# Patient Record
Sex: FEMALE | Race: BLACK OR AFRICAN AMERICAN | Employment: UNEMPLOYED | URBAN - METROPOLITAN AREA
[De-identification: names, ages, dates, MRNs, and addresses within clinical notes are randomized per-mention and may not be internally consistent; named-entity substitution may affect disease eponyms.]

---

## 2021-10-22 ENCOUNTER — HOSPITAL ENCOUNTER (EMERGENCY)
Age: 21
Discharge: HOME | End: 2021-10-22
Attending: PEDIATRICS
Payer: MEDICAID

## 2021-10-22 ENCOUNTER — APPOINTMENT (OUTPATIENT)
Dept: CT IMAGING | Age: 21
End: 2021-10-22
Attending: PEDIATRICS
Payer: MEDICAID

## 2021-10-22 ENCOUNTER — APPOINTMENT (OUTPATIENT)
Dept: GENERAL RADIOLOGY | Age: 21
End: 2021-10-22
Attending: PEDIATRICS
Payer: MEDICAID

## 2021-10-22 VITALS
DIASTOLIC BLOOD PRESSURE: 77 MMHG | WEIGHT: 207.01 LBS | OXYGEN SATURATION: 99 % | SYSTOLIC BLOOD PRESSURE: 117 MMHG | HEART RATE: 69 BPM | RESPIRATION RATE: 16 BRPM | TEMPERATURE: 98.4 F

## 2021-10-22 DIAGNOSIS — J01.40 ACUTE PANSINUSITIS, RECURRENCE NOT SPECIFIED: Primary | ICD-10-CM

## 2021-10-22 LAB
FLUAV AG NPH QL IA: NEGATIVE
FLUBV AG NOSE QL IA: NEGATIVE
SARS-COV-2, COV2: NORMAL

## 2021-10-22 PROCEDURE — 87804 INFLUENZA ASSAY W/OPTIC: CPT

## 2021-10-22 PROCEDURE — 70486 CT MAXILLOFACIAL W/O DYE: CPT

## 2021-10-22 PROCEDURE — 99283 EMERGENCY DEPT VISIT LOW MDM: CPT

## 2021-10-22 PROCEDURE — 71045 X-RAY EXAM CHEST 1 VIEW: CPT

## 2021-10-22 PROCEDURE — U0003 INFECTIOUS AGENT DETECTION BY NUCLEIC ACID (DNA OR RNA); SEVERE ACUTE RESPIRATORY SYNDROME CORONAVIRUS 2 (SARS-COV-2) (CORONAVIRUS DISEASE [COVID-19]), AMPLIFIED PROBE TECHNIQUE, MAKING USE OF HIGH THROUGHPUT TECHNOLOGIES AS DESCRIBED BY CMS-2020-01-R: HCPCS

## 2021-10-22 RX ORDER — PREDNISONE 20 MG/1
40 TABLET ORAL
Qty: 6 TABLET | Refills: 0 | Status: SHIPPED | OUTPATIENT
Start: 2021-10-22 | End: 2021-10-25

## 2021-10-22 RX ORDER — AMOXICILLIN AND CLAVULANATE POTASSIUM 875; 125 MG/1; MG/1
1 TABLET, FILM COATED ORAL 2 TIMES DAILY
Qty: 20 TABLET | Refills: 0 | Status: SHIPPED | OUTPATIENT
Start: 2021-10-22 | End: 2021-11-01

## 2021-10-22 NOTE — ED TRIAGE NOTES
Pt reports she started with sore throat Friday which has since resolved. Congestion and SOB started Monday. Denies fever. Denies vomiting or diarrhea.

## 2021-10-22 NOTE — ED NOTES
DISCHARGE: Patient given discharge instructions including prescriptions, suggested FU with PCP, accessing My Chart, returning for s/s of worsening, voiced understanding. EDUCATION: Patient educated on prescriptions and possible side effects, using motrin/tylenol for fever/pain, increasing PO fluids, provided work/school excuse and COVID note, educated on accessing My Chart for COVID results, monitoring for s/s of worsening such as increased work of breathing/lethargy/inability to tolerated PO fluids, practicing good hand/cough hygiene, voiced understanding.

## 2021-10-22 NOTE — ED PROVIDER NOTES
HPI wise healthy 71-year-old woman presents to the ER with nasal congestion and rhinorrhea and headaches when she moves her head for the last several days. She feels a little short of breath and has rare cough. She has had no fever and no vomiting but is lost her sense of taste and loss of sense of smell. She is currently on her menstrual period and states she is not pregnant. History reviewed. No pertinent past medical history. History reviewed. No pertinent surgical history. History reviewed. No pertinent family history. Social History     Socioeconomic History    Marital status: Not on file     Spouse name: Not on file    Number of children: Not on file    Years of education: Not on file    Highest education level: Not on file   Occupational History    Not on file   Tobacco Use    Smoking status: Never Smoker    Smokeless tobacco: Never Used   Substance and Sexual Activity    Alcohol use: Not Currently    Drug use: Never    Sexual activity: Not on file   Other Topics Concern    Not on file   Social History Narrative    Not on file     Social Determinants of Health     Financial Resource Strain:     Difficulty of Paying Living Expenses:    Food Insecurity:     Worried About Running Out of Food in the Last Year:     [address] in the Last Year:    Transportation Needs:     Lack of Transportation (Medical):      Lack of Transportation (Non-Medical):    Physical Activity:     Days of Exercise per Week:     Minutes of Exercise per Session:    Stress:     Feeling of Stress :    Social Connections:     Frequency of Communication with Friends and Family:     Frequency of Social Gatherings with Friends and Family:     Attends Protestant Services:     Active Member of Clubs or Organizations:     Attends Club or Organization Meetings:     Marital Status:    Intimate Partner Violence:     Fear of Current or Ex-Partner:     Emotionally Abused:     Physically Abused:     Sexually Abused:    Medications: None  Immunizations: Up-to-date including Covid vaccine and influenza vaccine  Social history: Patient does not smoke, drink, or use drugs. Patient is not sexually active and denies trauma. ALLERGIES: Patient has no known allergies. Review of Systems   Constitutional: Negative for fever. States she has lost her sense of taste and smell   HENT: Positive for congestion and rhinorrhea. Respiratory: Positive for cough. Gastrointestinal: Negative for diarrhea and vomiting. All other systems reviewed and are negative. Vitals:    10/22/21 1143 10/22/21 1144   BP: 129/87    Pulse: 89    Resp: 16    Temp: 97 °F (36.1 °C)    SpO2: 97%    Weight:  93.9 kg (207 lb 0.2 oz)            Physical Exam   Nursing note and vitals reviewed. Constitutional: appears well-developed and well-nourished. No distress. HENT:   Head: Normocephalic and atraumatic. Sclera anicteric  Nose: Swollen boggy red turbinates bilaterally. Mouth/Throat: Oropharynx is clear and moist. Pharynx normal  Eyes: Conjunctivae are normal. Pupils are equal, round, and reactive to light. Right eye exhibits no discharge. Left eye exhibits no discharge. No scleral icterus. Neck: Painless normal range of motion. Supple  Cardiovascular: Normal rate, regular rhythm, normal heart sounds and intact distal pulses. Exam reveals no gallop and no friction rub. No murmur heard. Pulmonary/Chest: Effort normal and breath sounds normal. No respiratory distress. no wheezes. no rales. Abdominal: Soft. Bowel sounds are normal. Exhibits no distension and no mass. No tenderness. No guarding. Musculoskeletal: Normal range of motion. no tenderness. No edema  Lymphadenopathy:   No cervical adenopathy. Neurological:  Alert and oriented to person, place, and time. Coordination normal. CN 2-12 intact. Moving all extremities. Skin: Skin is warm and dry. No rash noted. No pallor.      MDM  Number of Diagnoses or Management Options  Diagnosis management comments: Well-appearing 81-year-old woman who is concern for the possibility of a sinus infection versus COVID-19. She is being seen with her sister who has similar complaints and had 2 negative Covid test prior to her arrival.  Will obtain a sinus screening CT as she clinically appears to have sinusitis, obtain a portable chest x-ray, obtain a COVID-19 PCR and a rapid flu test.    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 XR CHEST PORT   Final Result   No Acute Disease. Labs Reviewed   INFLUENZA A+B VIRAL AGS   SARS-COV-2   SARS-COV-2   Influenza test negative, COVID-19 test pending. Sinusitis by CT in a patient with signs and symptoms and examination consistent with sinusitis. Treat with 10 days of Augmentin and a 3-day prednisone burst.  Follow-up with primary care physician Monday.   To isolate at home pending the results of her COVID-19 test.       Procedures

## 2021-10-22 NOTE — DISCHARGE INSTRUCTIONS
You were seen in the emergency department with upper respiratory infection symptoms and sinusitis symptoms and were diagnosed with pansinusitis by CT. Your influenza test is negative and your COVID-19 test is pending. Please isolate at home to the results of your COVID-19 test are known and you have been cleared by your primary care provider. Please take the Augmentin and the prednisone as prescribed and follow-up Monday with your primary care provider. Return to the emergency department for increased work of breathing or any concerns. Thank you for allowing us to provide you with medical care today. We realize that you have many choices for your emergency care needs. We thank you for choosing [facility].  Please choose us in the future for any continued health care needs. We hope we addressed all of your medical concerns. We strive to provide excellent quality care in the Emergency Department. Anything less than excellent does not meet our expectations. The exam and treatment you received in the Emergency Department were for an emergent problem and are not intended as complete care. It is important that you follow up with a doctor, nurse practitioner, or [phone] assistant for ongoing care. If your symptoms worsen or you do not improve as expected and you are unable to reach your usual health care provider, you should return to the Emergency Department. We are available 24 hours a day. Take this sheet with you when you go to your follow-up visit. If you have any problem arranging the follow-up visit, contact the Emergency Department immediately. Make an appointment your family doctor for follow up of this visit. Return to the ER if you are unable to be seen in a timely manner.

## 2021-10-22 NOTE — Clinical Note
[address]  [address] [facility] DEPT  [address] [facility] [address]  [phone]    Work/School Note    Date: 10/22/2021     To Whom It May concern:    Edward Shannon was evaulated by the following provider(s):  Attending Provider: Neftali Nicolas MD.   Becka Arzate virus is suspected. Per the CDC guidelines we recommend home isolation until the following conditions are all met:    1. At least 10 days have passed since symptoms first appeared and  2. At least 24 hours have passed since last fever without the use of fever-reducing medications and  3.  Symptoms (e.g., cough, shortness of breath) have improved    Sincerely,          Marcial Hutchinson MD

## 2021-10-22 NOTE — Clinical Note
[address]  [address] [facility] DEPT  [address] [facility] [address]  [phone]    Work/School Note    Date: 10/22/2021    To Whom It May concern:    Adelina Hernandez was seen and treated today in the emergency room by the following provider(s):  Attending Provider: Dave Bergeron MD.      Adelina Hernandez is excused from work/school on 10/22/2021 through 10/25/2021. She is medically clear to return to work/school on 10/26/2021.         Sincerely,          Rosalina Mccartney MD

## 2021-10-24 LAB
SARS-COV-2, XPLCVT: NOT DETECTED
SOURCE, COVRS: NORMAL

## 2022-05-06 ENCOUNTER — OFFICE VISIT (OUTPATIENT)
Dept: PRIMARY CARE CLINIC | Age: 22
End: 2022-05-06
Payer: MEDICAID

## 2022-05-06 VITALS
TEMPERATURE: 97.2 F | DIASTOLIC BLOOD PRESSURE: 76 MMHG | RESPIRATION RATE: 12 BRPM | WEIGHT: 206 LBS | HEIGHT: 69 IN | HEART RATE: 60 BPM | OXYGEN SATURATION: 97 % | BODY MASS INDEX: 30.51 KG/M2 | SYSTOLIC BLOOD PRESSURE: 118 MMHG

## 2022-05-06 DIAGNOSIS — L74.510 HYPERHIDROSIS OF AXILLA: ICD-10-CM

## 2022-05-06 DIAGNOSIS — Z76.89 ENCOUNTER TO ESTABLISH CARE: ICD-10-CM

## 2022-05-06 DIAGNOSIS — Z00.00 ANNUAL PHYSICAL EXAM: Primary | ICD-10-CM

## 2022-05-06 DIAGNOSIS — E55.9 VITAMIN D DEFICIENCY: ICD-10-CM

## 2022-05-06 DIAGNOSIS — Z23 NEED FOR HPV VACCINE: ICD-10-CM

## 2022-05-06 DIAGNOSIS — Z11.59 NEED FOR HEPATITIS C SCREENING TEST: ICD-10-CM

## 2022-05-06 DIAGNOSIS — F41.9 ANXIETY: ICD-10-CM

## 2022-05-06 PROCEDURE — 99204 OFFICE O/P NEW MOD 45 MIN: CPT

## 2022-05-06 RX ORDER — HUMAN PAPILLOMAVIRUS 9-VALENT VACCINE, RECOMBINANT 30; 40; 60; 40; 20; 20; 20; 20; 20 UG/.5ML; UG/.5ML; UG/.5ML; UG/.5ML; UG/.5ML; UG/.5ML; UG/.5ML; UG/.5ML; UG/.5ML
0.5 INJECTION, SUSPENSION INTRAMUSCULAR ONCE
Qty: 0.5 ML | Refills: 0 | Status: SHIPPED | OUTPATIENT
Start: 2022-05-06 | End: 2022-05-06

## 2022-05-06 NOTE — PROGRESS NOTES
Chief Complaint   Patient presents with   [address] Maintenance Due   Topic    Hepatitis C Screening     HPV Age 9Y-34Y (2 - 3-dose series)    DTaP/Tdap/Td series (3 - Td or Tdap)        1. Have you been to the ER, urgent care clinic since your last visit? Hospitalized since your last visit? No    2. Have you seen or consulted any other health care providers outside of the [address] [place] [address] since your last visit? Include any pap smears or colon screening.  No    Visit Vitals  Wt 206 lb (93.4 kg)

## 2022-05-06 NOTE — PROGRESS NOTES
[facility]   [address], [address] [facility]  P: [phone]  F: [phone]    SUBJECTIVE     HPI:     Ziyad Boss is a 25 y.o. female who is seen in the clinic to establish care  Chief Complaint   Patient presents with   Frank Hester Establish Care        Patient is new to our practice, here for an annual physical/to establish care. She does not take any daily medications. She is not fasting for labs and will return next week to get blood work done. She c/o excessive sweating to bilateral axilla. She has tried different brands/strengths of deodorants but none have worked. Happens regardless of cold/warm weather. No night sweats, foul odor, or discolored sweat. Excessive sweating in social settings is bothersome to her. C/o anxiety on and off since middle school. 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 Sometimes it is related to excess sweating in social situations or week prior to her period. Denies palpitations. Denies excessive caffeine intake, drinks one coffee/week at most. Drinks at least 8-10 glasses of water per day. She is interested in counseling, does not want to try medication for this at this time. Family history: Mother and father have DM2, HTN. Per patient no hyperlipidemia, cancers, MI/CVA. Diet: eats varied diet of red meat, chicken, vegetables, fruits. Trying to be more mindful of portions. Exercise: Weight lifting, elliptical. 3-4 x per week. Sleep Hygiene: Sleeps at least 6 hour per night. No issues staying or falling asleep. GYN Provider: Dr Rod Milligan with [facility]  Self-Breast Exams: encouraged to do monthly  Menses: Regular, lasting 3-4 days. Denies heavy periods/large clots. Pertinent health screenings:  Never had Pap smear, not sexually active  Immunizations: Had one dose of HPV vaccine, needs to complete series.  COVID-19, Influenza, TDAP, UTD    There are no problems to display for this patient. History reviewed. No pertinent past medical history. History reviewed. No pertinent surgical history. Social History     Socioeconomic History    Marital status: SINGLE     Spouse name: Not on file    Number of children: Not on file    Years of education: Not on file    Highest education level: Not on file   Occupational History    Not on file   Tobacco Use    Smoking status: Never Smoker    Smokeless tobacco: Never Used   Vaping Use    Vaping Use: Never used   Substance and Sexual Activity    Alcohol use: Not Currently     Comment: occ    Drug use: Never    Sexual activity: Not Currently   Other Topics Concern    Not on file   Social History Narrative    Not on file     Social Determinants of Health     Financial Resource Strain:     Difficulty of Paying Living Expenses: Not on file   Food Insecurity:     Worried About Running Out of Food in the Last Year: Not on file    Nadine of Food in the Last Year: Not on file   Transportation Needs:     Lack of Transportation (Medical): Not on file    Lack of Transportation (Non-Medical):  Not on file   Physical Activity:     Days of Exercise per Week: Not on file    Minutes of Exercise per Session: Not on file   Stress:     Feeling of Stress : Not on file   Social Connections:     Frequency of Communication with Friends and Family: Not on file    Frequency of Social Gatherings with Friends and Family: Not on file    Attends Congregational Services: Not on file    Active Member of Clubs or Organizations: Not on file    Attends Club or Organization Meetings: Not on file    Marital Status: Not on file   Intimate Partner Violence:     Fear of Current or Ex-Partner: Not on file    Emotionally Abused: Not on file    Physically Abused: Not on file    Sexually Abused: Not on file   Housing Stability:     Unable to Pay for Housing in the Last Year: Not on file    Number of Places Lived in the Last Year: Not on file    Unstable Housing in the Last Year: Not on file     Family History   Problem Relation Age of Onset    Diabetes Mother     Diabetes Father     Hypertension Father        There is no immunization history on file for this patient. No Known Allergies    No visits with results within 3 Month(s) from this visit. Latest known visit with results is:   Admission on 10/22/2021, Discharged on 10/22/2021   Component Date Value Ref Range Status    Influenza A Antigen 10/22/2021 Negative  NEG   Final    Influenza B Antigen 10/22/2021 Negative  NEG   Final    SARS-CoV-2 by PCR 10/22/2021 Please find results under separate order    Final    Specimen source 10/22/2021 Nasopharyngeal    Final    SARS-CoV-2 10/22/2021 Not detected  NOTD   Final    Comment:      The specimen is NEGATIVE for SARS-CoV-2, the novel coronavirus associated with COVID-19. A negative result does not rule out COVID-19. Cliff SARS-CoV-2 for use on the Cliff 6800/8800 [employer] is a real-time RT-PCR test intended for the qualitative detection of nucleic acids from SARS-CoV-2  in clinician-collected nasal, nasopharyngeal,and oropharyngeal swab specimens from individuals who meet COVID-19 clinical and/or epidemiological criteria. Cliff SARS-CoV-2 is for use only under Emergency Use Authorization (EUA) in laboratories certified under [address] (CLIA), [place], that meet requirements to perform high or moderate complexity tests. An individual without symptoms of COVID-19 and who is not shedding SARS-CoV-2 virus would expect to have a negative (not detected) result in this assay.   Fact sheet for Healthcare Providers: ConventionUpdate.co.nz  Fact sheet for Patients: http://www.herring.RunnerPlace/                           [address]26/download       Methodology: RT-PCR        CT MAXILLOFACIAL WO CONT  Narrative: INDICATION: evaluate for sinusitis     EXAM: CT maxillofacial is performed. No contrast.  CT dose reduction was  achieved through the use of a standardized protocol tailored for this  examination and automatic exposure control for dose modulation. FINDINGS:    The frontal sinus shows 3 mm mucosal thickening inferiorly, adjacent to the  opacified, nonexpanded nasofrontal ducts, without fluid level or mucocele. The ethmoid air cells show extensive bilateral mucosal thickening without fluid  level or mucocele. The right maxillary sinus shows minimal, 2-3 mm, mucosal thickening without  fluid level or mucocele. The sinus ostium/ethmoidal infundibulum and adjacent  middle meatus are minimally opacified, nonexpanded. The left maxillary sinus shows moderate mucosal thickening 6-7 mm without fluid  level or mucocele. The sinus ostium /ethmoidal infundibulum and adjacent middle  meatus are soft tissue filled but not expanded. The sphenoid sinus shows 2-3 mm mucosal thickening inferiorly without fluid  level or mucocele. Sphenoid sinus ostia appear clear. The nasal cavity shows left predominant mucosal thickening. There is no sherri  bullosa formation. Nasal septum bows to the left. Middle ear is and mastoids are clear. 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 XR CHEST PORT  Narrative: EXAM: Portable CXR. 1437 hours. INDICATION: Shortness of breath, nasal congestion, r/o pna    FINDINGS:  The lungs appear clear. Heart is normal in size. There is no pulmonary edema. There is no evident pneumothorax or pleural effusion. Impression: No Acute Disease. The past medical history, past surgical history, and family history were reviewed and updated in the medical record. Lab values/Imaging were reviewed. The medications were reviewed and updated in the medical record. Immunizations were reviewed and updated in the medical record.   All relevant preventative screenings reviewed and updated in the medical record. REVIEW OF SYSTEMS   Review of Systems   Constitutional: Negative for chills, fever, malaise/fatigue and weight loss. Respiratory: Negative for cough and shortness of breath. Cardiovascular: Negative for chest pain, palpitations and leg swelling. Gastrointestinal: Negative for constipation, diarrhea, heartburn, nausea and vomiting. Genitourinary: Negative for dysuria. Musculoskeletal: Negative for myalgias. Neurological: Negative for headaches. Endo/Heme/Allergies: Negative for environmental allergies. Psychiatric/Behavioral: Negative for depression. PHYSICAL EXAM   /76 (BP 1 Location: Left upper arm, BP Patient Position: Sitting, BP Cuff Size: Adult)   Pulse 60   Temp 97.2 °F (36.2 °C) (Temporal)   Resp 12   Ht 5' 9\" (1.753 m)   Wt 206 lb (93.4 kg)   LMP 05/04/2022 (Exact Date)   SpO2 97%   BMI 30.42 kg/m²      Physical Exam  Constitutional:       General: She is not in acute distress. HENT:      Right Ear: Tympanic membrane normal.      Left Ear: Tympanic membrane normal.   Cardiovascular:      Rate and Rhythm: Normal rate and regular rhythm. Pulses: Normal pulses. Heart sounds: Normal heart sounds. Pulmonary:      Effort: Pulmonary effort is normal.      Breath sounds: Normal breath sounds. Abdominal:      General: Bowel sounds are normal. There is no distension. Palpations: Abdomen is soft. There is no mass. Tenderness: There is no abdominal tenderness. Musculoskeletal:      Right lower leg: No edema. Left lower leg: No edema. Skin:     General: Skin is warm and dry. Neurological:      Mental Status: She is alert and oriented to person, place, and time. Sensory: No sensory deficit. Motor: No weakness. Gait: Gait normal.   Psychiatric:         Mood and Affect: Mood normal.          Breast/Pelvic exam deferred.      ASSESSMENT/ PLAN   Below is the assessment and plan developed based on review of pertinent history, physical exam, labs, studies, and medications. Diagnoses and all orders for this visit:    1. Annual physical exam  -     Physical exam completed, labs pending.   - Not sexually active, never had pap smear. Followed by [facility], Dr Irvin Segura. Asked patient to request records from their office to send. - Given information about birth control for future if interested. Discussed birth control can help with irrit  - THYROID CASCADE PROFILE; Future  -     CBC WITH AUTOMATED DIFF; Future  -     LIPID PANEL; Future  -     MICROALBUMIN, UR, RAND W/ MICROALB/CREAT RATIO; Future  -     METABOLIC PANEL, COMPREHENSIVE; Future  -     HEMOGLOBIN A1C WITH EAG; Future    2. Anxiety  -     Encouraged to continue exercise and limitation of caffeine.   - Per patient, anxiety does not impact quality of life or ability to perform at school.  - Discussed timing of anxiety with menstrual cycle and treatment options. She prefers to pursue counseling.  - REFERRAL TO PSYCHOLOGY    3. Hyperhidrosis of axilla  -     aluminum chloride (DRYSOL) 20 % external solution; Apply to dry skin at bedtime, wash off in the morning. Do not use on broken, recently shaved, or irritated skin. Once excessive sweating decreases (usually after 2 treatments) decrease use to 1-2 times per week. If burning sensation occurs discontinue use. Indications: excessive sweating    4. Need for hepatitis C screening test  -     HEPATITIS C AB; Future    5. Need for HPV vaccine  -     human papillomav vac,9-vaibhav,PF, (Gardasil 9, PF,) susp injection; 0.5 mL by IntraMUSCular route once for 1 dose. 6. Vitamin D deficiency  -     VITAMIN D, 25 HYDROXY; Future    7. Encounter to establish care                     Disclaimer:  Advised patient to call back or return to office if symptoms worsen/change/persist.  Discussed expected course/resolution/complications of diagnosis in detail with patient.      Medication risks/benefits/alternatives discussed with patient. Patient was given an after visit summary which includes diagnoses, current medications, & vitals. Discussed patient instructions and advised to read to all patient instructions regarding care. Patient expressed understanding with the diagnosis and plan.        Jersey Coe NP  5/6/2022

## 2022-07-14 ENCOUNTER — OFFICE VISIT (OUTPATIENT)
Dept: PRIMARY CARE CLINIC | Age: 22
End: 2022-07-14
Payer: MEDICAID

## 2022-07-14 VITALS
TEMPERATURE: 97.5 F | WEIGHT: 209.6 LBS | SYSTOLIC BLOOD PRESSURE: 114 MMHG | HEART RATE: 72 BPM | HEIGHT: 69 IN | BODY MASS INDEX: 31.04 KG/M2 | RESPIRATION RATE: 18 BRPM | DIASTOLIC BLOOD PRESSURE: 69 MMHG | OXYGEN SATURATION: 96 %

## 2022-07-14 DIAGNOSIS — L74.510 HYPERHIDROSIS OF AXILLA: ICD-10-CM

## 2022-07-14 DIAGNOSIS — R61 EXCESSIVE SWEATING: ICD-10-CM

## 2022-07-14 DIAGNOSIS — Z01.419 WELL WOMAN EXAM: Primary | ICD-10-CM

## 2022-07-14 PROCEDURE — 99214 OFFICE O/P EST MOD 30 MIN: CPT

## 2022-07-14 NOTE — PROGRESS NOTES
Identified pt with two pt identifiers(name and ). Chief Complaint   Patient presents with    Follow-up        3 most recent PHQ Screens 2022   Little interest or pleasure in doing things Not at all   Feeling down, depressed, irritable, or hopeless Not at all   Total Score PHQ 2 0        Vitals:    22 1428   BP: 114/69   Pulse: 72   Resp: 18   Temp: 97.5 °F (36.4 °C)   TempSrc: Temporal   SpO2: 96%   Weight: 209 lb 9.6 oz (95.1 kg)   Height: 5' 9\" (1.753 m)   PainSc:   0 - No pain   LMP: 2022       Health Maintenance Due   Topic Date Due    Hepatitis C Screening  Never done    HPV Age 9Y-34Y (2 - 3-dose series) 2021        1. Have you been to the ER, urgent care clinic since your last visit? Hospitalized since your last visit? No    2. Have you seen or consulted any other health care providers outside of the [address] [place] [address] since your last visit? Include any pap smears or colon screening. No    3. For patients aged 39-70: Has the patient had a colonoscopy / FIT/ Cologuard? NA - based on age      If the patient is female:    4. For patients aged 41-77: Has the patient had a mammogram within the past 2 years? NA - based on age or sex      11. For patients aged 21-65: Has the patient had a pap smear?  No

## 2022-07-14 NOTE — PROGRESS NOTES
[facility]   [address]  P: [phone]  F: [phone]    SUBJECTIVE     HPI:     John Roman is a 25 y.o. female who is seen in the clinic for   Chief Complaint   Patient presents with    Follow-up        She is an established patient here for follow up, last office visit in May 2022. She was given Drysol for excessive sweating. States she that she did not consistently use Drysol. She is asking if she can try glycopyrrolate because she saw it on social media. She was diagnosed with bacterial vaginosis about 1 year ago by OB-GYN on pelvic exam. I do not have the records. Per patient, the wet prep was sent and did not show BV. She was given Metronidazole and was told to use orally, but continued to have vaginal discharge and itching. She saw another OB-GYN who told her to use intravaginal Metronidazole for an acute flare, then take boric acid suppository weekly and daily probiotic. Today she denies itching or increased vaginal discharge. She wants me to check her for BV and do a pap smear. She has never had a pap smear prior to day, and is not sexually active. She also notes that she has a \"cyst\" to her mons pubis that comes and goes since middle school. No change in size or location since previously seen by OB-GYN    There are no problems to display for this patient. History reviewed. No pertinent past medical history. History reviewed. No pertinent surgical history.   Social History     Socioeconomic History    Marital status: SINGLE     Spouse name: Not on file    Number of children: Not on file    Years of education: Not on file    Highest education level: Not on file   Occupational History    Not on file   Tobacco Use    Smoking status: Never Smoker    Smokeless tobacco: Never Used   Vaping Use    Vaping Use: Never used   Substance and Sexual Activity    Alcohol use: Not Currently     Comment: occ    Drug use: Never    Sexual activity: Not Currently   Other Topics Concern    Not on file   Social History Narrative    Not on file     Social Determinants of Health     Financial Resource Strain:     Difficulty of Paying Living Expenses: Not on file   Food Insecurity:     Worried About Running Out of Food in the Last Year: Not on file    Nadine of Food in the Last Year: Not on file   Transportation Needs:     Lack of Transportation (Medical): Not on file    Lack of Transportation (Non-Medical):  Not on file   Physical Activity:     Days of Exercise per Week: Not on file    Minutes of Exercise per Session: Not on file   Stress:     Feeling of Stress : Not on file   Social Connections:     Frequency of Communication with Friends and Family: Not on file    Frequency of Social Gatherings with Friends and Family: Not on file    Attends Muslim Services: Not on file    Active Member of [address] [place], [place] [address] or Organizations: Not on file    Attends Club or Organization Meetings: Not on file    Marital Status: Not on file   Intimate Partner Violence:     Fear of Current or Ex-Partner: Not on file    Emotionally Abused: Not on file    Physically Abused: Not on file    Sexually Abused: Not on file   Housing Stability:     Unable to Pay for Housing in the Last Year: Not on file    Number of [place] in the Last Year: Not on file    Unstable Housing in the Last Year: Not on file     Family History   Problem Relation Age of Onset    Diabetes Mother     Diabetes Father     Hypertension Father      Immunization History   Administered Date(s) Administered    COVID-19, MODERNA BLUE border, Primary or Immunocompromised, (age 18y+), IM, 100 mcg/0.5mL 04/15/2021, 05/06/2021    HPV 06/23/2021    Hep A Vaccine 09/07/2018    Hep B Vaccine 07/27/2021    Meningococcal (MCV4) Vaccine 09/07/2018, 06/23/2021    Meningococcal ACWY Vaccine 06/23/2021    Tdap 03/23/2005, 04/24/2012      No Known Allergies    No visits with results within 3 Month(s) from this visit. Latest known visit with results is:   Admission on 10/22/2021, Discharged on 10/22/2021   Component Date Value Ref Range Status    Influenza A Antigen 10/22/2021 Negative  NEG   Final    Influenza B Antigen 10/22/2021 Negative  NEG   Final    SARS-CoV-2 by PCR 10/22/2021 Please find results under separate order    Final    Specimen source 10/22/2021 Nasopharyngeal    Final    SARS-CoV-2 10/22/2021 Not detected  NOTD   Final    Comment:      The specimen is NEGATIVE for SARS-CoV-2, the novel coronavirus associated with COVID-19. A negative result does not rule out COVID-19. Cliff SARS-CoV-2 for use on the Cliff [employer].st0/8800 [employer] is a real-time RT-PCR test intended for the qualitative detection of nucleic acids from SARS-CoV-2  in clinician-collected nasal, nasopharyngeal,and oropharyngeal swab specimens from individuals who meet COVID-19 clinical and/or epidemiological criteria. Cliff SARS-CoV-2 is for use only under Emergency Use Authorization (EUA) in laboratories certified under [address] (CLIA), [place] 216[place], that meet requirements to perform high or moderate complexity tests. An individual without symptoms of COVID-19 and who is not shedding SARS-CoV-2 virus would expect to have a negative (not detected) result in this assay. Fact sheet for Healthcare Providers: ConventionUpdate.co.nz  Fact sheet for Patients: http://www.herring.cookdinner/                           92414/download       Methodology: RT-PCR        CT MAXILLOFACIAL WO CONT  Narrative: INDICATION: evaluate for sinusitis     EXAM: CT maxillofacial is performed. No contrast.  CT dose reduction was  achieved through the use of a standardized protocol tailored for this  examination and automatic exposure control for dose modulation.     FINDINGS:    The frontal sinus shows 3 mm mucosal thickening inferiorly, adjacent to the  opacified, nonexpanded nasofrontal ducts, without fluid level or mucocele. The ethmoid air cells show extensive bilateral mucosal thickening without fluid  level or mucocele. The right maxillary sinus shows minimal, 2-3 mm, mucosal thickening without  fluid level or mucocele. The sinus ostium/ethmoidal infundibulum and adjacent  middle meatus are minimally opacified, nonexpanded. The left maxillary sinus shows moderate mucosal thickening 6-7 mm without fluid  level or mucocele. The sinus ostium /ethmoidal infundibulum and adjacent middle  meatus are soft tissue filled but not expanded. The sphenoid sinus shows 2-3 mm mucosal thickening inferiorly without fluid  level or mucocele. Sphenoid sinus ostia appear clear. The nasal cavity shows left predominant mucosal thickening. There is no sherri  bullosa formation. Nasal septum bows to the left. Middle ear is and mastoids are clear. 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 XR CHEST PORT  Narrative: EXAM: Portable CXR. 1437 hours. INDICATION: Shortness of breath, nasal congestion, r/o pna    FINDINGS:  The lungs appear clear. Heart is normal in size. There is no pulmonary edema. There is no evident pneumothorax or pleural effusion. Impression: No Acute Disease. Current Outpatient Medications   Medication Sig Dispense Refill    aluminum chloride (DRYSOL) 20 % external solution Apply to dry skin at bedtime, wash off in the morning. Do not use on broken, recently shaved, or irritated skin. Once excessive sweating decreases (usually after 2 treatments) decrease use to 1-2 times per week. If burning sensation occurs discontinue use. Indications: excessive sweating (Patient not taking: Reported on 7/14/2022) 1 Each 0           The past medical history, past surgical history, and family history were reviewed and updated in the medical record. Lab values/Imaging were reviewed.     The medications were reviewed and updated in the medical record. Immunizations were reviewed and updated in the medical record. All relevant preventative screenings reviewed and updated in the medical record. REVIEW OF SYSTEMS   Review of Systems   Constitutional: Negative for chills, fever and malaise/fatigue. Respiratory: Negative for cough, shortness of breath and wheezing. Cardiovascular: Negative for chest pain, palpitations and leg swelling. Gastrointestinal: Negative for abdominal pain. Genitourinary: Negative for dysuria and urgency. Musculoskeletal: Negative for back pain and myalgias. Neurological: Negative for dizziness and headaches. Psychiatric/Behavioral: Negative for depression and suicidal ideas. The patient is not nervous/anxious. PHYSICAL EXAM   /69 (BP 1 Location: Left upper arm, BP Patient Position: Sitting, BP Cuff Size: Adult)   Pulse 72   Temp 97.5 °F (36.4 °C) (Temporal)   Resp 18   Ht 5' 9\" (1.753 m)   Wt 209 lb 9.6 oz (95.1 kg)   LMP 06/14/2022 (Approximate)   SpO2 96%   BMI 30.95 kg/m²      Physical Exam  Exam conducted with a chaperone present. Constitutional:       Appearance: She is obese. Cardiovascular:      Rate and Rhythm: Normal rate and regular rhythm. Pulses: Normal pulses. Heart sounds: Normal heart sounds. Pulmonary:      Effort: Pulmonary effort is normal.      Breath sounds: Normal breath sounds. Genitourinary:     Exam position: Lithotomy position. Labia:         Right: No rash or tenderness. Left: No rash or tenderness. Vagina: Vaginal discharge present. No bleeding. Cervix: Discharge present. No lesion or cervical bleeding. Comments: Thick white discharge to cervix noted. Musculoskeletal:      Right lower leg: No edema. Left lower leg: No edema. Skin:     General: Skin is warm and dry. Neurological:      Mental Status: She is alert.             ASSESSMENT/ PLAN   Below is the assessment and plan developed based on review of pertinent history, physical exam, labs, studies, and medications. 1. Well woman exam  - Pap completed with Beckie Serrano CMA present. Thick cervical discharge noted on exam. Results pending.   - PAP IG, APTIMA HPV AND RFX 16/18,45 ([id number]); Future  -     BACTERIAL VAGINOSIS, MENDY; Future  - REFERRAL TO OBSTETRICS AND GYNECOLOGY   2. Excessive sweating  - Discussed Drysol and administration, patient agreeable to consistently administer before trying other therapies. Disclaimer:  Advised patient to call back or return to office if symptoms worsen/change/persist.  Discussed expected course/resolution/complications of diagnosis in detail with patient. Medication risks/benefits/alternatives discussed with patient. Patient was given an after visit summary which includes diagnoses, current medications, & vitals. Discussed patient instructions and advised to read to all patient instructions regarding care. Patient expressed understanding with the diagnosis and plan.        Wilberto Graff NP  7/14/2022

## 2022-07-17 LAB
CYTOLOGIST CVX/VAG CYTO: NORMAL
CYTOLOGY CVX/VAG DOC CYTO: NORMAL
CYTOLOGY CVX/VAG DOC THIN PREP: NORMAL
DX ICD CODE: NORMAL
HPV I/H RISK 4 DNA CVX QL PROBE+SIG AMP: NEGATIVE
Lab: NORMAL
OTHER STN SPEC: NORMAL
STAT OF ADQ CVX/VAG CYTO-IMP: NORMAL

## 2022-07-18 ENCOUNTER — TELEPHONE (OUTPATIENT)
Dept: PRIMARY CARE CLINIC | Age: 22
End: 2022-07-18

## 2022-07-18 LAB
A VAGINAE DNA VAG QL NAA+PROBE: NORMAL SCORE
BVAB2 DNA VAG QL NAA+PROBE: NORMAL SCORE
MEGA1 DNA VAG QL NAA+PROBE: NORMAL SCORE

## 2022-07-18 NOTE — PROGRESS NOTES
Results reviewed. Diego Butt,  Your Pap test and HPV screening both came back normal. Next due in 2025.   Take care,  Edie

## 2022-07-18 NOTE — TELEPHONE ENCOUNTER
Called Pt and went over last lab results and informed pt if she needed help with [employer] account to let us know

## 2022-07-18 NOTE — PROGRESS NOTES
Results reviewed, release via [employer]. Rozina Butt,  I reviewed your bacterial vaginosis swab test result. It states that BV is not found on the swab. Have you made an appt with OB-GYN yet? I hope you are feeling well. George Dobson, can you please help her set up a [employer] Access? Thank you.

## 2022-10-07 ENCOUNTER — OFFICE VISIT (OUTPATIENT)
Dept: OBGYN CLINIC | Age: 22
End: 2022-10-07
Payer: MEDICAID

## 2022-10-07 VITALS
WEIGHT: 207 LBS | SYSTOLIC BLOOD PRESSURE: 112 MMHG | BODY MASS INDEX: 31.37 KG/M2 | DIASTOLIC BLOOD PRESSURE: 84 MMHG | HEIGHT: 68 IN

## 2022-10-07 DIAGNOSIS — N89.8 VAGINAL ODOR: Primary | ICD-10-CM

## 2022-10-07 PROCEDURE — 99204 OFFICE O/P NEW MOD 45 MIN: CPT | Performed by: OBSTETRICS & GYNECOLOGY

## 2022-10-07 NOTE — PROGRESS NOTES
Establish Care Visit    James Klein is a 25 y.o. presenting to establish Ob-Gyn care. Patient is concerned about recurrent bacterial vaginosis. Ob/Gyn Hx:  G0  LMP- 10/07/2022  Menses- regular  Contraception- none  SA- never   Pap: 7/22/2022 wnl  Gardasil 1/3        History reviewed. No pertinent past medical history. History reviewed. No pertinent surgical history. Family History   Problem Relation Age of Onset    Diabetes Mother     Diabetes Father     Hypertension Father        Social History     Socioeconomic History    Marital status: SINGLE     Spouse name: Not on file    Number of children: Not on file    Years of education: Not on file    Highest education level: Not on file   Occupational History    Not on file   Tobacco Use    Smoking status: Never    Smokeless tobacco: Never   Vaping Use    Vaping Use: Never used   Substance and Sexual Activity    Alcohol use: Yes     Comment: occ    Drug use: Never    Sexual activity: Never   Other Topics Concern    Not on file   Social History Narrative    Not on file     Social Determinants of Health     Financial Resource Strain: Not on file   Food Insecurity: Not on file   Transportation Needs: Not on file   Physical Activity: Not on file   Stress: Not on file   Social Connections: Not on file   Intimate Partner Violence: Not on file   Housing Stability: Not on file       Current Outpatient Medications   Medication Sig Dispense Refill    aluminum chloride (DRYSOL) 20 % external solution Apply to dry skin at bedtime, wash off in the morning. Do not use on broken, recently shaved, or irritated skin. Once excessive sweating decreases (usually after 2 treatments) decrease use to 1-2 times per week. If burning sensation occurs discontinue use.   Indications: excessive sweating (Patient not taking: No sig reported) 1 Each 0       No Known Allergies    Review of Systems - History obtained from the patient, Review of System is negative except as otherwise noted in the [facility] in [place]      Physical Exam    Visit Vitals  /84   Ht 5' 8\" (1.727 m)   Wt 207 lb (93.9 kg)   BMI 31.47 kg/m²       OBGyn Exam    Constitutional  Appearance: well-nourished, well developed, alert, in no acute distress    HENT  Head and Face: appears normal    Neck  Inspection/Palpation: normal appearance, no masses or tenderness  Thyroid: gland size normal, nontender    Chest  Respiratory Effort: non-labored breathing. Clear to auscultation bilaterally    Cardiovascular  Heart:  Extremities: no peripheral edema    Gastrointestinal  Abdominal Examination: abdomen non-tender to palpation, non-distended    Skin  General Inspection: no rash, no lesions identified  Vaginal: dark blood in the vault     Neurologic/Psychiatric  Mental Status:  Orientation: grossly oriented to person, place and time  Mood and Affect: mood normal, affect appropriate      Assessment/Plan:  Gregory Trammell is a 25 y.o. G0   1. Vaginal odor  -Patient has concern for recurrent BV. Difficult exam today given significant amount of blood on exam.  No documented bacterial vaginosis upon chart review. Nuswab plus collected today for further evaluation as physical exam was inconclusive. She does desire women's ultra marly for her subjective recurrent bacterial vaginosis. 2.  Establish care  -Patient denies being sexually active. She declines contraception. All questions answered   Follow-up and Dispositions    Return in about 3 months (around 1/7/2023) for follow up on women's ultraflora . All questions answered. Argentina Villatoro MD

## 2022-10-10 LAB
A VAGINAE DNA VAG QL NAA+PROBE: NORMAL SCORE
BVAB2 DNA VAG QL NAA+PROBE: NORMAL SCORE
C ALBICANS DNA VAG QL NAA+PROBE: NEGATIVE
C GLABRATA DNA VAG QL NAA+PROBE: NEGATIVE
C TRACH DNA VAG QL NAA+PROBE: NEGATIVE
MEGA1 DNA VAG QL NAA+PROBE: NORMAL SCORE
N GONORRHOEA DNA VAG QL NAA+PROBE: NEGATIVE
T VAGINALIS DNA VAG QL NAA+PROBE: NEGATIVE

## 2022-12-13 ENCOUNTER — OFFICE VISIT (OUTPATIENT)
Dept: OBGYN CLINIC | Age: 22
End: 2022-12-13
Payer: MEDICAID

## 2022-12-13 VITALS
SYSTOLIC BLOOD PRESSURE: 110 MMHG | DIASTOLIC BLOOD PRESSURE: 78 MMHG | HEIGHT: 68 IN | BODY MASS INDEX: 31.52 KG/M2 | WEIGHT: 208 LBS

## 2022-12-13 DIAGNOSIS — N89.8 VAGINAL ODOR: Primary | ICD-10-CM

## 2022-12-13 NOTE — PROGRESS NOTES
GYN Problem Visit    Lupe Robert is a 25 y.o.  presenting for problem visit. Her main concern today is following up on ultraflora probiotic. Patient started using probiotics 10/2022. Reports not taking consistently. She stops probiotic during her period and hasn't taken them in about 4 days now. She reports she does not feel her vaginal odor has gone away. Ob/Gyn Hx:  G0  LMP- 11/26/2022  Menses- regular  Contraception- none  SA- never   Pap: 7/22/2022 wnl  Gardasil 1/3      History reviewed. No pertinent past medical history. History reviewed. No pertinent surgical history. Family History   Problem Relation Age of Onset    Diabetes Mother     Diabetes Father     Hypertension Father        Social History     Socioeconomic History    Marital status: SINGLE     Spouse name: Not on file    Number of children: Not on file    Years of education: Not on file    Highest education level: Not on file   Occupational History    Not on file   Tobacco Use    Smoking status: Never    Smokeless tobacco: Never   Vaping Use    Vaping Use: Never used   Substance and Sexual Activity    Alcohol use: Yes     Comment: occ    Drug use: Never    Sexual activity: Never   Other Topics Concern    Not on file   Social History Narrative    Not on file     Social Determinants of Health     Financial Resource Strain: Not on file   Food Insecurity: Not on file   Transportation Needs: Not on file   Physical Activity: Not on file   Stress: Not on file   Social Connections: Not on file   Intimate Partner Violence: Not on file   Housing Stability: Not on file       Current Outpatient Medications   Medication Sig Dispense Refill    OTHER ULTRAFLORA PROBIOTIC      aluminum chloride (DRYSOL) 20 % external solution Apply to dry skin at bedtime, wash off in the morning. Do not use on broken, recently shaved, or irritated skin. Once excessive sweating decreases (usually after 2 treatments) decrease use to 1-2 times per week.  If burning sensation occurs discontinue use.   Indications: excessive sweating (Patient not taking: No sig reported) 1 Each 0       No Known Allergies    Review of Systems - History obtained from the patient  Constitutional: negative for weight loss, fever, night sweats  HEENT: negative for hearing loss, earache, congestion, snoring, sorethroat  CV: negative for chest pain, palpitations, edema  Resp: negative for cough, shortness of breath, wheezing  GI: negative for change in bowel habits, abdominal pain, black or bloody stools  : negative for frequency, dysuria, hematuria, vaginal discharge  MSK: negative for back pain, joint pain, muscle pain  Breast: negative for breast lumps, nipple discharge, galactorrhea  Skin :negative for itching, rash, hives  Neuro: negative for dizziness, headache, confusion, weakness  Psych: negative for anxiety, depression, change in mood  Heme/lymph: negative for bleeding, bruising, pallor    Physical Exam    Visit Vitals  /78   Ht 5' 8\" (1.727 m)   Wt 208 lb (94.3 kg)   BMI 31.63 kg/m²         OBGyn Exam      Constitutional  Appearance: well-nourished, well developed, alert, in no acute distress    HENT  Head and Face: appears normal    Chest  Respiratory Effort: non-labored breathing    Cardiovascular  Extremities: no peripheral edema    Genitourinary  External Genitalia: normal appearance for age, no discharge present, no tenderness present, no inflammatory lesions present, no masses present, no atrophy present  Vagina: normal vaginal vault without central or paravaginal defects, no discharge present, no inflammatory lesions present, no masses present  Bladder: non-tender to palpation  Urethra: appears normal  Cervix: normal   Uterus: normal size, shape and consistency  Adnexa: no adnexal tenderness present, no adnexal masses present  Perineum: perineum within normal limits, no evidence of trauma, no rashes or skin lesions present    Skin  General Inspection: no rash, no lesions identified    Neurologic/Psychiatric  Mental Status:  Orientation: grossly oriented to person, place and time  Mood and Affect: mood normal, affect appropriate      Assessment/Plan:  Filomena Becerra IS A 25 y.o.    1. Vaginal odor  - Nuswab collected again today at patient request. Physical exam is benign. Discussed proper hygiene in detail. She is using Deoderant need to vulva at times. Discussed consistent use of ultra marly. Patient willing to continue ultraflora. Will contact patient with results. All questions answered         Patient will return to office PRN or for next annual exam          Argentina Figueroa MD

## 2022-12-15 LAB
A VAGINAE DNA VAG QL NAA+PROBE: NORMAL SCORE
BVAB2 DNA VAG QL NAA+PROBE: NORMAL SCORE
C ALBICANS DNA VAG QL NAA+PROBE: NEGATIVE
C GLABRATA DNA VAG QL NAA+PROBE: NEGATIVE
MEGA1 DNA VAG QL NAA+PROBE: NORMAL SCORE

## 2023-01-16 ENCOUNTER — TELEPHONE (OUTPATIENT)
Dept: OBGYN CLINIC | Age: 23
End: 2023-01-16

## 2023-01-16 NOTE — TELEPHONE ENCOUNTER
Pt calling name and  verified.  Pt states she is having same symptoms she had at 2022/ she is asking if she needs an appointment or if she needs to take probiotic twice a day instead of once ,       Please advise thank you

## 2023-01-16 NOTE — TELEPHONE ENCOUNTER
Called patient verified name and  and advised per below  Brody Lazaro MD  to Me    SS    3:32 PM  Use probiotic as no sign of infection on previous swab

## 2023-02-23 ENCOUNTER — OFFICE VISIT (OUTPATIENT)
Dept: OBGYN CLINIC | Age: 23
End: 2023-02-23
Payer: MEDICAID

## 2023-02-23 VITALS — WEIGHT: 212 LBS | DIASTOLIC BLOOD PRESSURE: 88 MMHG | BODY MASS INDEX: 32.23 KG/M2 | SYSTOLIC BLOOD PRESSURE: 132 MMHG

## 2023-02-23 DIAGNOSIS — N89.8 VAGINAL ODOR: Primary | ICD-10-CM

## 2023-02-23 PROCEDURE — 99213 OFFICE O/P EST LOW 20 MIN: CPT | Performed by: OBSTETRICS & GYNECOLOGY

## 2023-02-23 NOTE — PROGRESS NOTES
GYN Problem Visit    Jesús Single is a 25 y.o.  presenting for problem visit. Her main concern today is follow up for vaginal odor. Patient states odor comes and goes, but is \"mostly gone\". She is currently taking women's ultra marly probiotic. She feels that this is helping. Ob/Gyn Hx:  JENNIFER P  -0  LMP-2/14/23  Menses- regular   Contraception-none   SA-yes       History reviewed. No pertinent past medical history. History reviewed. No pertinent surgical history.     Family History   Problem Relation Age of Onset    Diabetes Mother     Diabetes Father     Hypertension Father        Social History     Socioeconomic History    Marital status: SINGLE     Spouse name: Not on file    Number of children: Not on file    Years of education: Not on file    Highest education level: Not on file   Occupational History    Not on file   Tobacco Use    Smoking status: Never    Smokeless tobacco: Never   Vaping Use    Vaping Use: Never used   Substance and Sexual Activity    Alcohol use: Yes     Comment: occ    Drug use: Never    Sexual activity: Never   Other Topics Concern    Not on file   Social History Narrative    Not on file     Social Determinants of Health     Financial Resource Strain: Not on file   Food Insecurity: Not on file   Transportation Needs: Not on file   Physical Activity: Not on file   Stress: Not on file   Social Connections: Not on file   Intimate Partner Violence: Not on file   Housing Stability: Not on file       Current Outpatient Medications   Medication Sig Dispense Refill    OTHER ULTRAFLORA PROBIOTIC         No Known Allergies    Review of Systems - History obtained from the patient  Constitutional: negative for weight loss, fever, night sweats  HEENT: negative for hearing loss, earache, congestion, snoring, sorethroat  CV: negative for chest pain, palpitations, edema  Resp: negative for cough, shortness of breath, wheezing  GI: negative for change in bowel habits, abdominal pain, black or bloody stools  : negative for frequency, dysuria, hematuria, vaginal discharge  MSK: negative for back pain, joint pain, muscle pain  Breast: negative for breast lumps, nipple discharge, galactorrhea  Skin :negative for itching, rash, hives  Neuro: negative for dizziness, headache, confusion, weakness  Psych: negative for anxiety, depression, change in mood  Heme/lymph: negative for bleeding, bruising, pallor    Physical Exam    Visit Vitals  /88   Wt 212 lb (96.2 kg)   BMI 32.23 kg/m²         OBGyn Exam      Constitutional  Appearance: well-nourished, well developed, alert, in no acute distress    HENT  Head and Face: appears normal    Neurologic/Psychiatric  Mental Status:  Orientation: grossly oriented to person, place and time  Mood and Affect: mood normal, affect appropriate      Assessment/Plan:  Filomena Becerra IS A 25 y.o.    1. Vaginal odor  -We discussed the differential diagnosis to her vaginal odor in detail. Patient overall feels that her symptoms have improved since taking woman's ultra marly. Recent nuswab was negative for infection. Discussed vaginal and vulvar health in detail. Discussed avoidance of scented soaps and lotions. Patient not douching. Patient will return to office as needed or for next annual exam.  All questions answered. Argentina Boyd MD

## 2023-04-19 ENCOUNTER — OFFICE VISIT (OUTPATIENT)
Dept: PRIMARY CARE CLINIC | Age: 23
End: 2023-04-19
Payer: MEDICAID

## 2023-04-19 VITALS
RESPIRATION RATE: 18 BRPM | DIASTOLIC BLOOD PRESSURE: 73 MMHG | HEART RATE: 60 BPM | SYSTOLIC BLOOD PRESSURE: 118 MMHG | OXYGEN SATURATION: 95 % | TEMPERATURE: 97.3 F | WEIGHT: 210 LBS | HEIGHT: 68 IN | BODY MASS INDEX: 31.83 KG/M2

## 2023-04-19 DIAGNOSIS — K59.09 OTHER CONSTIPATION: ICD-10-CM

## 2023-04-19 DIAGNOSIS — Z83.79 FAMILY HISTORY OF CELIAC DISEASE: ICD-10-CM

## 2023-04-19 DIAGNOSIS — R14.0 BLOATING: Primary | ICD-10-CM

## 2023-04-19 PROCEDURE — 99213 OFFICE O/P EST LOW 20 MIN: CPT

## 2023-04-19 RX ORDER — SIMETHICONE 80 MG
80 TABLET,CHEWABLE ORAL
Qty: 30 TABLET | Refills: 0 | Status: SHIPPED | OUTPATIENT
Start: 2023-04-19

## 2023-04-19 NOTE — PROGRESS NOTES
Identified pt with two pt identifiers(name and ). Chief Complaint   Patient presents with    Abdominal Pain     Patient states she has been having abdominal pain patient stated she can go almost 2-3 days without eating and still feel full        3 most recent PHQ Screens 2023   Little interest or pleasure in doing things Not at all   Feeling down, depressed, irritable, or hopeless Not at all   Total Score PHQ 2 0        Vitals:    23 1557   BP: 118/73   Pulse: 60   Resp: 18   Temp: 97.3 °F (36.3 °C)   TempSrc: Temporal   SpO2: 95%   Weight: 210 lb (95.3 kg)   Height: 5' 8\" (1.727 m)   PainSc:   0 - No pain       Health Maintenance Due   Topic Date Due    Hepatitis C Screening  Never done    Varicella Vaccine (1 of 2 - 2-dose childhood series) Never done    COVID-19 Vaccine (3 - Booster) 2021    HPV Age 9Y-26Y (2 - 3-dose series) 2021    Pap Smear  2022        1. Have you been to the ER, urgent care clinic since your last visit? Hospitalized since your last visit? No    2. Have you seen or consulted any other health care providers outside of the [address] [place] [address] since your last visit? Include any pap smears or colon screening. No    3. For patients aged 39-70: Has the patient had a colonoscopy / FIT/ Cologuard? NA - based on age      If the patient is female:    4. For patients aged 41-77: Has the patient had a mammogram within the past 2 years? NA - based on age or sex      11. For patients aged 21-65: Has the patient had a pap smear?  Yes - no Care Gap present

## 2023-04-19 NOTE — PROGRESS NOTES
[facility]   [address], [address] [facility]  P: [phone]  F: [phone]    SUBJECTIVE     HPI:     Austin Tong is a 26 y.o. female who is seen in the clinic for   Chief Complaint   Patient presents with    Abdominal Pain     Patient states she has been having abdominal pain patient stated she can go almost 2-3 days without eating and still feel full          Established patient here with c/o abdominal pain, bloating. Last seen by me 5/2022 for annual physical exam and establishing care. Labs were ordered at that time which are still pending. She c/o bloating and mild abdominal pain. She has felt bloated for several months, but is worse in the past month. Feels bloated intermittently, not experiencing it currently. She has not had a change in her bowel pattern, but notes constipation is not unusual for her. States she has a BM about every 4 days. She has started taking OTC fiber supplements which have made her BM's regular, having 1 per day now. BM's are formed, denies straining or pain with BM's. Endorses flatulence but no belching. Takes a vaginal probiotic daily. There are no problems to display for this patient. History reviewed. No pertinent past medical history. History reviewed. No pertinent surgical history.   Social History     Socioeconomic History    Marital status: SINGLE     Spouse name: Not on file    Number of children: Not on file    Years of education: Not on file    Highest education level: Not on file   Occupational History    Not on file   Tobacco Use    Smoking status: Never    Smokeless tobacco: Never   Vaping Use    Vaping Use: Never used   Substance and Sexual Activity    Alcohol use: Yes     Comment: occ    Drug use: Never    Sexual activity: Never   Other Topics Concern    Not on file   Social History Narrative    Not on file     Social Determinants of Health     Financial Resource Strain: Not on file   Food Insecurity: Not on file Transportation Needs: Not on file   Physical Activity: Not on file   Stress: Not on file   Social Connections: Not on file   Intimate Partner Violence: Not on file   Housing Stability: Not on file     Family History   Problem Relation Age of Onset    Diabetes Mother     Diabetes Father     Hypertension Father      Immunization History   Administered Date(s) Administered    COVID-19, MODERNA BLUE border, Primary or Immunocompromised, (age 18y+), IM, 100 mcg/0.5mL 04/15/2021, 05/06/2021    HPV 06/23/2021    Hep A Vaccine 09/07/2018    Hep B Vaccine 07/27/2021    Meningococcal (MCV4) Vaccine 09/07/2018, 06/23/2021    Meningococcal ACWY Vaccine 06/23/2021    Tdap 03/23/2005, 04/24/2012      No Known Allergies    No visits with results within 3 Month(s) from this visit. Latest known visit with results is:   Office Visit on 12/13/2022   Component Date Value Ref Range Status    Atopobium vaginae 12/13/2022 Low - 0  Score Final    BVAB 2 12/13/2022 Low - 0  Score Final    Megasphaera 1 12/13/2022 Low - 0  Score Final    Comment: Calculate total score by adding the 3 individual bacterial  vaginosis (BV) marker scores together. Total score is  interpreted as follows: Total score 0-1: Indicates the absence of BV. Total score   2: Indeterminate for BV. Additional clinical                   data should be evaluated to establish a                   diagnosis. Total score 3-6: Indicates the presence of BV. This test was developed and its performance characteristics  determined by Ale Mahoney.  It has not been cleared or approved  by the Food and Drug Administration. C. albicans, MENDY 12/13/2022 Negative  Negative Final    C. glabrata, MENDY 12/13/2022 Negative  Negative Final      CT MAXILLOFACIAL WO CONT  Narrative: INDICATION: evaluate for sinusitis     EXAM: CT maxillofacial is performed.  No contrast.  CT dose reduction was  achieved through the use of a standardized protocol tailored for this  examination and automatic exposure control for dose modulation. FINDINGS:    The frontal sinus shows 3 mm mucosal thickening inferiorly, adjacent to the  opacified, nonexpanded nasofrontal ducts, without fluid level or mucocele. The ethmoid air cells show extensive bilateral mucosal thickening without fluid  level or mucocele. The right maxillary sinus shows minimal, 2-3 mm, mucosal thickening without  fluid level or mucocele. The sinus ostium/ethmoidal infundibulum and adjacent  middle meatus are minimally opacified, nonexpanded. The left maxillary sinus shows moderate mucosal thickening 6-7 mm without fluid  level or mucocele. The sinus ostium /ethmoidal infundibulum and adjacent middle  meatus are soft tissue filled but not expanded. The sphenoid sinus shows 2-3 mm mucosal thickening inferiorly without fluid  level or mucocele. Sphenoid sinus ostia appear clear. The nasal cavity shows left predominant mucosal thickening. There is no sherri  bullosa formation. Nasal septum bows to the left. Middle ear is and mastoids are clear. 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 XR CHEST PORT  Narrative: EXAM: Portable CXR. 1437 hours. INDICATION: Shortness of breath, nasal congestion, r/o pna    FINDINGS:  The lungs appear clear. Heart is normal in size. There is no pulmonary edema. There is no evident pneumothorax or pleural effusion. Impression: No Acute Disease. Current Outpatient Medications   Medication Sig Dispense Refill    OTHER ULTRAFLORA PROBIOTIC             The past medical history, past surgical history, and family history were reviewed and updated in the medical record. Lab values/Imaging were reviewed. The medications were reviewed and updated in the medical record. Immunizations were reviewed and updated in the medical record. All relevant preventative screenings reviewed and updated in the medical record.   REVIEW OF SYSTEMS   Review of Systems   Constitutional: Negative for chills, diaphoresis, fever, malaise/fatigue and weight loss. Respiratory:  Negative for cough, shortness of breath and wheezing. Cardiovascular:  Negative for chest pain, palpitations and leg swelling. Gastrointestinal:  Positive for constipation. Negative for abdominal pain, blood in stool, diarrhea, heartburn, melena, nausea and vomiting. Genitourinary:  Negative for dysuria, flank pain, frequency, hematuria and urgency. Musculoskeletal:  Negative for falls and myalgias. Neurological:  Negative for headaches. Psychiatric/Behavioral:  Negative for depression. The patient is not nervous/anxious. PHYSICAL EXAM   /73 (BP 1 Location: Right upper arm, BP Patient Position: Sitting, BP Cuff Size: Adult)   Pulse 60   Temp 97.3 °F (36.3 °C) (Temporal)   Resp 18   Ht 5' 8\" (1.727 m)   Wt 210 lb (95.3 kg)   SpO2 95%   BMI 31.93 kg/m²      Physical Exam  Vitals and nursing note reviewed. Constitutional:       General: She is not in acute distress. Appearance: Normal appearance. She is obese. She is not ill-appearing or toxic-appearing. Eyes:      Conjunctiva/sclera: Conjunctivae normal.      Pupils: Pupils are equal, round, and reactive to light. Cardiovascular:      Rate and Rhythm: Normal rate and regular rhythm. Pulses: Normal pulses. Heart sounds: Normal heart sounds. No murmur heard. No gallop. Pulmonary:      Effort: Pulmonary effort is normal. No respiratory distress. Breath sounds: Normal breath sounds. No stridor. No wheezing or rales. Abdominal:      General: Bowel sounds are normal. There is no distension. Palpations: Abdomen is soft. There is no mass. Tenderness: There is no abdominal tenderness. There is no guarding or rebound. Hernia: No hernia is present. Skin:     General: Skin is warm and dry. Neurological:      General: No focal deficit present.       Mental Status: She is alert and oriented to person, place, and time. Mental status is at baseline. Psychiatric:         Mood and Affect: Mood normal.         Behavior: Behavior normal.          ASSESSMENT/ PLAN   Below is the assessment and plan developed based on review of pertinent history, physical exam, labs, studies, and medications. 1. Bloating  -     Exam negative for acute abdomen. I prescribed simethicone as needed. Potential side effects were discussed. - Continue probiotic, fiber supplement, adequate hydration. Will check labs. - I suggested she try to eliminate potential food triggers: try to cut out gluten to see if symptoms improve. If no response, try cutting out lactose. Etc. -     I referred her to Dr Beverley Clements, Allergy, for testing for food allergies. -     CELIAC DISEASE PROFILE (REFLEX TTG, IGG); Future  -     REFERRAL TO ALLERGY  -     CBC WITH AUTOMATED DIFF; Future  -     METABOLIC PANEL, COMPREHENSIVE; Future  2. Other constipation  - Improved with OTC Fiber supplement. - Will check TSH. 3. Family history of celiac disease  -     I referred her to Dr Beverley Clements, Allergy, for testing for food allergies. - REFERRAL TO ALLERGY    RTC in 1 month      Disclaimer:  Advised patient to call back or return to office if symptoms worsen/change/persist.  Discussed expected course/resolution/complications of diagnosis in detail with patient. Medication risks/benefits/alternatives discussed with patient. Patient was given an after visit summary which includes diagnoses, current medications, & vitals. Discussed patient instructions and advised to read to all patient instructions regarding care. Patient expressed understanding with the diagnosis and plan.        Selma Osorio NP  4/19/2023

## 2023-04-21 LAB
ALBUMIN SERPL-MCNC: 4.2 G/DL (ref 3.9–5)
ALBUMIN/GLOB SERPL: 1.4 {RATIO} (ref 1.2–2.2)
ALP SERPL-CCNC: 79 IU/L (ref 44–121)
ALT SERPL-CCNC: 6 IU/L (ref 0–32)
AST SERPL-CCNC: 9 IU/L (ref 0–40)
BASOPHILS # BLD AUTO: 0 X10E3/UL (ref 0–0.2)
BASOPHILS NFR BLD AUTO: 1 %
BILIRUB SERPL-MCNC: 0.3 MG/DL (ref 0–1.2)
BUN SERPL-MCNC: 7 MG/DL (ref 6–20)
BUN/CREAT SERPL: 9 (ref 9–23)
CALCIUM SERPL-MCNC: 9.5 MG/DL (ref 8.7–10.2)
CHLORIDE SERPL-SCNC: 105 MMOL/L (ref 96–106)
CO2 SERPL-SCNC: 23 MMOL/L (ref 20–29)
CREAT SERPL-MCNC: 0.8 MG/DL (ref 0.57–1)
EGFRCR SERPLBLD CKD-EPI 2021: 107 ML/MIN/1.73
EOSINOPHIL # BLD AUTO: 0 X10E3/UL (ref 0–0.4)
EOSINOPHIL NFR BLD AUTO: 0 %
ERYTHROCYTE [DISTWIDTH] IN BLOOD BY AUTOMATED COUNT: 13.4 % (ref 11.7–15.4)
GLOBULIN SER CALC-MCNC: 3 G/DL (ref 1.5–4.5)
GLUCOSE SERPL-MCNC: 81 MG/DL (ref 70–99)
HCT VFR BLD AUTO: 36.1 % (ref 34–46.6)
HGB BLD-MCNC: 11.6 G/DL (ref 11.1–15.9)
IMM GRANULOCYTES # BLD AUTO: 0 X10E3/UL (ref 0–0.1)
IMM GRANULOCYTES NFR BLD AUTO: 0 %
LYMPHOCYTES # BLD AUTO: 1 X10E3/UL (ref 0.7–3.1)
LYMPHOCYTES NFR BLD AUTO: 27 %
MCH RBC QN AUTO: 28.3 PG (ref 26.6–33)
MCHC RBC AUTO-ENTMCNC: 32.1 G/DL (ref 31.5–35.7)
MCV RBC AUTO: 88 FL (ref 79–97)
MONOCYTES # BLD AUTO: 0.4 X10E3/UL (ref 0.1–0.9)
MONOCYTES NFR BLD AUTO: 9 %
NEUTROPHILS # BLD AUTO: 2.4 X10E3/UL (ref 1.4–7)
NEUTROPHILS NFR BLD AUTO: 63 %
PLATELET # BLD AUTO: 280 X10E3/UL (ref 150–450)
POTASSIUM SERPL-SCNC: 4.6 MMOL/L (ref 3.5–5.2)
PROT SERPL-MCNC: 7.2 G/DL (ref 6–8.5)
RBC # BLD AUTO: 4.1 X10E6/UL (ref 3.77–5.28)
SODIUM SERPL-SCNC: 140 MMOL/L (ref 134–144)
WBC # BLD AUTO: 3.8 X10E3/UL (ref 3.4–10.8)

## 2023-04-23 LAB
ENDOMYSIUM IGA SER QL: NEGATIVE
IGA SERPL-MCNC: 303 MG/DL (ref 87–352)
TSH SERPL DL<=0.005 MIU/L-ACNC: 0.91 UIU/ML (ref 0.45–4.5)
TTG IGA SER-ACNC: <2 U/ML (ref 0–3)

## 2023-08-16 ENCOUNTER — OFFICE VISIT (OUTPATIENT)
Dept: PRIMARY CARE CLINIC | Facility: CLINIC | Age: 23
End: 2023-08-16
Payer: MEDICAID

## 2023-08-16 VITALS
OXYGEN SATURATION: 99 % | RESPIRATION RATE: 20 BRPM | SYSTOLIC BLOOD PRESSURE: 116 MMHG | HEIGHT: 68 IN | BODY MASS INDEX: 32.49 KG/M2 | DIASTOLIC BLOOD PRESSURE: 74 MMHG | TEMPERATURE: 97.8 F | WEIGHT: 214.4 LBS | HEART RATE: 69 BPM

## 2023-08-16 DIAGNOSIS — N89.8 VAGINAL ODOR: ICD-10-CM

## 2023-08-16 DIAGNOSIS — L40.9 PSORIASIS: Primary | ICD-10-CM

## 2023-08-16 PROCEDURE — 99214 OFFICE O/P EST MOD 30 MIN: CPT | Performed by: FAMILY MEDICINE

## 2023-08-16 SDOH — ECONOMIC STABILITY: INCOME INSECURITY: HOW HARD IS IT FOR YOU TO PAY FOR THE VERY BASICS LIKE FOOD, HOUSING, MEDICAL CARE, AND HEATING?: PATIENT DECLINED

## 2023-08-16 SDOH — ECONOMIC STABILITY: FOOD INSECURITY: WITHIN THE PAST 12 MONTHS, THE FOOD YOU BOUGHT JUST DIDN'T LAST AND YOU DIDN'T HAVE MONEY TO GET MORE.: PATIENT DECLINED

## 2023-08-16 SDOH — ECONOMIC STABILITY: HOUSING INSECURITY
IN THE LAST 12 MONTHS, WAS THERE A TIME WHEN YOU DID NOT HAVE A STEADY PLACE TO SLEEP OR SLEPT IN A SHELTER (INCLUDING NOW)?: PATIENT REFUSED

## 2023-08-16 SDOH — ECONOMIC STABILITY: FOOD INSECURITY: WITHIN THE PAST 12 MONTHS, YOU WORRIED THAT YOUR FOOD WOULD RUN OUT BEFORE YOU GOT MONEY TO BUY MORE.: PATIENT DECLINED

## 2023-08-16 ASSESSMENT — PATIENT HEALTH QUESTIONNAIRE - PHQ9
SUM OF ALL RESPONSES TO PHQ QUESTIONS 1-9: 0
2. FEELING DOWN, DEPRESSED OR HOPELESS: 0
SUM OF ALL RESPONSES TO PHQ QUESTIONS 1-9: 0
SUM OF ALL RESPONSES TO PHQ QUESTIONS 1-9: 0
1. LITTLE INTEREST OR PLEASURE IN DOING THINGS: 0
SUM OF ALL RESPONSES TO PHQ9 QUESTIONS 1 & 2: 0
SUM OF ALL RESPONSES TO PHQ QUESTIONS 1-9: 0

## 2023-08-16 ASSESSMENT — ENCOUNTER SYMPTOMS: COLOR CHANGE: 1

## 2023-08-22 RX ORDER — FLUOCINOLONE ACETONIDE 0.11 MG/ML
OIL TOPICAL
Qty: 118 ML | Refills: 0 | Status: SHIPPED | OUTPATIENT
Start: 2023-08-22

## 2023-08-23 ENCOUNTER — TELEPHONE (OUTPATIENT)
Dept: PRIMARY CARE CLINIC | Facility: CLINIC | Age: 23
End: 2023-08-23

## 2023-08-23 LAB
A VAGINAE DNA VAG QL NAA+PROBE: ABNORMAL SCORE
BVAB2 DNA VAG QL NAA+PROBE: ABNORMAL SCORE
C ALBICANS DNA VAG QL NAA+PROBE: NEGATIVE
C GLABRATA DNA VAG QL NAA+PROBE: NEGATIVE
C KRUSEI DNA VAG QL NAA+PROBE: NEGATIVE
C LUSITANIAE DNA VAG QL NAA+PROBE: NEGATIVE
C TRACH DNA VAG QL NAA+PROBE: NEGATIVE
CANDIDA DNA VAG QL NAA+PROBE: NEGATIVE
MEGA1 DNA VAG QL NAA+PROBE: ABNORMAL SCORE
N GONORRHOEA DNA VAG QL NAA+PROBE: NEGATIVE
T VAGINALIS DNA VAG QL NAA+PROBE: POSITIVE

## 2023-08-24 ENCOUNTER — TELEPHONE (OUTPATIENT)
Dept: PRIMARY CARE CLINIC | Facility: CLINIC | Age: 23
End: 2023-08-24

## 2023-08-24 NOTE — TELEPHONE ENCOUNTER
Spoke with patient and rescheduled her for 8/25 @ 820am to retest for swab patient was okay with time/day change

## 2023-08-24 NOTE — TELEPHONE ENCOUNTER
Patient said she spoke with Dr Ishmael Avila about a condition she has and she has another question. She asked to talk to a nurse.

## 2023-08-25 ENCOUNTER — OFFICE VISIT (OUTPATIENT)
Dept: PRIMARY CARE CLINIC | Facility: CLINIC | Age: 23
End: 2023-08-25
Payer: MEDICAID

## 2023-08-25 VITALS
HEIGHT: 68 IN | HEART RATE: 63 BPM | RESPIRATION RATE: 16 BRPM | TEMPERATURE: 97.1 F | WEIGHT: 214.8 LBS | DIASTOLIC BLOOD PRESSURE: 79 MMHG | BODY MASS INDEX: 32.55 KG/M2 | OXYGEN SATURATION: 98 % | SYSTOLIC BLOOD PRESSURE: 121 MMHG

## 2023-08-25 DIAGNOSIS — N89.8 VAGINAL DISCHARGE: Primary | ICD-10-CM

## 2023-08-25 DIAGNOSIS — N89.8 VAGINAL DISCHARGE: ICD-10-CM

## 2023-08-25 DIAGNOSIS — N89.8 VAGINAL ODOR: ICD-10-CM

## 2023-08-25 PROCEDURE — 99213 OFFICE O/P EST LOW 20 MIN: CPT

## 2023-08-25 ASSESSMENT — ENCOUNTER SYMPTOMS
NAUSEA: 0
CONSTIPATION: 0
VOMITING: 0
DIARRHEA: 0

## 2023-08-25 NOTE — PROGRESS NOTES
[facility]   [address] [place] [address] [address]  P: [phone]  F: [phone]    SUBJECTIVE     HPI:     Leelee Ann is a 26 y.o. female who is seen in the clinic for   Chief Complaint   Patient presents with    Gynecologic Exam     Would like a repeat of nuswab          Established patient here for a follow up on Nuswab results. She was seen by Dr Deng Finney 8/16 for skin rash and vaginal discharge. She had a Nuswab was + trichomonas vaginalis. She swabbed herself at her request and wonders if it was done incorrectly. States she sometimes notices a vaginal odor, usually when she is working out or sweating,. Sometimes has scant vaginal discharge. Today she states she is having scant brown discharge, which is normal for her after a period. Never sexually active. Denies pelvic pain. Does not use douches, tampons. Does follow Ob-Gyn, Pap smear is UTD and normal.     There is no problem list on file for this patient. History reviewed. No pertinent past medical history. No past surgical history on file. Social History     Socioeconomic History    Marital status: Single     Spouse name: Not on file    Number of children: Not on file    Years of education: Not on file    Highest education level: Not on file   Occupational History    Not on file   Tobacco Use    Smoking status: Never    Smokeless tobacco: Never   Vaping Use    Vaping Use: Never used   Substance and Sexual Activity    Alcohol use: Yes    Drug use: Never    Sexual activity: Not on file   Other Topics Concern    Not on file   Social History Narrative    Not on file     Social Determinants of Health     Financial Resource Strain: Unknown    Difficulty of Paying Living Expenses: Patient refused   Food Insecurity: Unknown    Worried About Running Out of Food in the Last Year: Patient refused    [address] in the Last Year: Patient refused   Transportation Needs: Unknown    Lack of Transportation (Medical):  Not on

## 2023-08-29 LAB
A VAGINAE DNA VAG QL NAA+PROBE: NORMAL SCORE
BVAB2 DNA VAG QL NAA+PROBE: NORMAL SCORE
C ALBICANS DNA VAG QL NAA+PROBE: NEGATIVE
C GLABRATA DNA VAG QL NAA+PROBE: NEGATIVE
C KRUSEI DNA VAG QL NAA+PROBE: NEGATIVE
C LUSITANIAE DNA VAG QL NAA+PROBE: NEGATIVE
C TRACH DNA VAG QL NAA+PROBE: NEGATIVE
CANDIDA DNA VAG QL NAA+PROBE: NEGATIVE
MEGA1 DNA VAG QL NAA+PROBE: NORMAL SCORE
N GONORRHOEA DNA VAG QL NAA+PROBE: NEGATIVE
T VAGINALIS DNA VAG QL NAA+PROBE: NEGATIVE

## 2024-01-04 ENCOUNTER — OFFICE VISIT (OUTPATIENT)
Dept: PRIMARY CARE CLINIC | Facility: CLINIC | Age: 24
End: 2024-01-04
Payer: MEDICAID

## 2024-01-04 VITALS
SYSTOLIC BLOOD PRESSURE: 122 MMHG | DIASTOLIC BLOOD PRESSURE: 75 MMHG | WEIGHT: 227 LBS | HEIGHT: 68 IN | HEART RATE: 55 BPM | BODY MASS INDEX: 34.4 KG/M2 | OXYGEN SATURATION: 99 % | RESPIRATION RATE: 16 BRPM | TEMPERATURE: 97.1 F

## 2024-01-04 DIAGNOSIS — R63.5 WEIGHT GAIN: ICD-10-CM

## 2024-01-04 DIAGNOSIS — Z13.220 SCREENING FOR CHOLESTEROL LEVEL: ICD-10-CM

## 2024-01-04 DIAGNOSIS — Z83.3 FAMILY HISTORY OF DIABETES MELLITUS: ICD-10-CM

## 2024-01-04 DIAGNOSIS — R63.5 WEIGHT GAIN: Primary | ICD-10-CM

## 2024-01-04 DIAGNOSIS — E66.09 CLASS 1 OBESITY DUE TO EXCESS CALORIES WITHOUT SERIOUS COMORBIDITY WITH BODY MASS INDEX (BMI) OF 34.0 TO 34.9 IN ADULT: ICD-10-CM

## 2024-01-04 PROCEDURE — 99214 OFFICE O/P EST MOD 30 MIN: CPT

## 2024-01-04 RX ORDER — CARBOXYMETHYLCELLULOSE/CITRIC 0.75 G
CAPSULE ORAL
Qty: 168 CAPSULE | Refills: 0 | Status: SHIPPED | OUTPATIENT
Start: 2024-01-04

## 2024-01-04 ASSESSMENT — PATIENT HEALTH QUESTIONNAIRE - PHQ9
1. LITTLE INTEREST OR PLEASURE IN DOING THINGS: 0
SUM OF ALL RESPONSES TO PHQ QUESTIONS 1-9: 0
2. FEELING DOWN, DEPRESSED OR HOPELESS: 0
SUM OF ALL RESPONSES TO PHQ9 QUESTIONS 1 & 2: 0
SUM OF ALL RESPONSES TO PHQ QUESTIONS 1-9: 0

## 2024-01-04 NOTE — PROGRESS NOTES
[facility]   [address], [address]  [place] [address]  P: [phone]  F: [phone]    SUBJECTIVE     HPI:     Adrienne Burroughs is a 23 y.o. female who is seen in the clinic for   Chief Complaint   Patient presents with    Discuss Medications        Established patient here requesting a medication for weight loss. She was last seen by me 8/23.     She is requesting to start Ozempic or similar medication for weight loss. In beginning of 2023 she weighed 203 lbs and although she reports no significant changes to diet, exercise, she has been gradually gaining weight. Weight is 227 today. \"I think food does not move out of my stomach as fast as other peoples\". Has a BM every 2-3 days but denies abdominal pain, straining. She has not been exercising as regularly as in the beginning of 2023. Diet is not restricted but eats most meals at home, notes that her family eats a lot of means with rice, pasta. She does not drink sodas. No regular use of etoh. + family hx of diabetes in both parents.     She recently graduated from Nursing School and studying for [employer]. 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     There is no problem list on file for this patient.       History reviewed. No pertinent past medical history.  History reviewed. No pertinent surgical history.  Social History     Socioeconomic History    Marital status: Single     Spouse name: Not on file    Number of children: Not on file    Years of education: Not on file    Highest education level: Not on file   Occupational History    Not on file   Tobacco Use    Smoking status: Never    Smokeless tobacco: Never   Vaping Use    Vaping Use: Never used   Substance and Sexual Activity    Alcohol use: Yes    Drug use: Never    Sexual activity: Not on file   Other Topics Concern    Not on file   Social History Narrative    Not on file     Social Determinants of Health     Financial Resource Strain: Patient Declined

## 2024-01-04 NOTE — PROGRESS NOTES
\"Have you been to the ER, urgent care clinic since your last visit?  Hospitalized since your last visit?\"    NO    “Have you seen or consulted any other health care providers outside of [facility] since your last visit?”    NO

## 2024-01-05 LAB
ALBUMIN SERPL-MCNC: 4.3 G/DL (ref 4–5)
ALBUMIN/GLOB SERPL: 1.4 {RATIO} (ref 1.2–2.2)
ALP SERPL-CCNC: 92 IU/L (ref 44–121)
ALT SERPL-CCNC: 15 IU/L (ref 0–32)
AST SERPL-CCNC: 15 IU/L (ref 0–40)
BILIRUB SERPL-MCNC: 0.3 MG/DL (ref 0–1.2)
BUN SERPL-MCNC: 9 MG/DL (ref 6–20)
BUN/CREAT SERPL: 12 (ref 9–23)
CALCIUM SERPL-MCNC: 9.4 MG/DL (ref 8.7–10.2)
CHLORIDE SERPL-SCNC: 104 MMOL/L (ref 96–106)
CHOLEST SERPL-MCNC: 178 MG/DL (ref 100–199)
CO2 SERPL-SCNC: 24 MMOL/L (ref 20–29)
CREAT SERPL-MCNC: 0.74 MG/DL (ref 0.57–1)
EGFRCR SERPLBLD CKD-EPI 2021: 117 ML/MIN/1.73
GLOBULIN SER CALC-MCNC: 3.1 G/DL (ref 1.5–4.5)
GLUCOSE SERPL-MCNC: 87 MG/DL (ref 70–99)
HBA1C MFR BLD: 5.5 % (ref 4.8–5.6)
HDLC SERPL-MCNC: 50 MG/DL
LDLC SERPL CALC-MCNC: 116 MG/DL (ref 0–99)
POTASSIUM SERPL-SCNC: 4.6 MMOL/L (ref 3.5–5.2)
PROT SERPL-MCNC: 7.4 G/DL (ref 6–8.5)
SODIUM SERPL-SCNC: 141 MMOL/L (ref 134–144)
TRIGL SERPL-MCNC: 62 MG/DL (ref 0–149)
TSH SERPL DL<=0.005 MIU/L-ACNC: 1.1 UIU/ML (ref 0.45–4.5)
VLDLC SERPL CALC-MCNC: 12 MG/DL (ref 5–40)

## 2024-03-18 ENCOUNTER — OFFICE VISIT (OUTPATIENT)
Dept: PRIMARY CARE CLINIC | Facility: CLINIC | Age: 24
End: 2024-03-18
Payer: MEDICAID

## 2024-03-18 ENCOUNTER — TELEPHONE (OUTPATIENT)
Age: 24
End: 2024-03-18

## 2024-03-18 VITALS
OXYGEN SATURATION: 99 % | SYSTOLIC BLOOD PRESSURE: 128 MMHG | WEIGHT: 226.2 LBS | HEIGHT: 68 IN | TEMPERATURE: 97.1 F | BODY MASS INDEX: 34.28 KG/M2 | HEART RATE: 68 BPM | DIASTOLIC BLOOD PRESSURE: 84 MMHG | RESPIRATION RATE: 16 BRPM

## 2024-03-18 DIAGNOSIS — E66.9 OBESITY (BMI 30.0-34.9): ICD-10-CM

## 2024-03-18 DIAGNOSIS — K58.1 IRRITABLE BOWEL SYNDROME WITH CONSTIPATION: Primary | ICD-10-CM

## 2024-03-18 DIAGNOSIS — E78.00 ELEVATED LDL CHOLESTEROL LEVEL: ICD-10-CM

## 2024-03-18 PROCEDURE — 99214 OFFICE O/P EST MOD 30 MIN: CPT | Performed by: INTERNAL MEDICINE

## 2024-03-18 RX ORDER — DICYCLOMINE HYDROCHLORIDE 10 MG/1
10 CAPSULE ORAL 3 TIMES DAILY
Qty: 90 CAPSULE | Refills: 0 | Status: SHIPPED | OUTPATIENT
Start: 2024-03-18 | End: 2024-04-17

## 2024-03-18 ASSESSMENT — ENCOUNTER SYMPTOMS
CONSTIPATION: 1
SORE THROAT: 0
DIARRHEA: 0
CHEST TIGHTNESS: 0
BACK PAIN: 0
ABDOMINAL PAIN: 0
EYE DISCHARGE: 0
COLOR CHANGE: 0
SHORTNESS OF BREATH: 0

## 2024-03-18 NOTE — PROGRESS NOTES
Adrienne Burroughs (:  2000) is a 23 y.o. female, Established patient, here for evaluation of the following chief complaint(s):  Follow-up (Doesn't know what about for today was told to resendiz a follow up- could be for the labs, or on weight gain, no medication was started for that at the last appointment )           ASSESSMENT/PLAN:  1. Irritable bowel syndrome with constipation  -     dicyclomine (BENTYL) 10 MG capsule; Take 1 capsule by mouth in the morning, at noon, and at bedtime, Disp-90 capsule, R-0Normal sent to pharmacy.   I prescribed Bentyl.  Potential side effects were discussed.   She should increase the amount of fiber in her diet.    2. Elevated LDL cholesterol level  Labs were reviewed.  I recommend that she monitor her diet and be physically active.    3. Obesity (BMI 30.0-34.9)  I recommend that she join the weight loss clinic.  She should walk at least 5k steps daily.  I explained that 5,000 steps are needed daily for healthy lifestyle and to maintain conditioning, and she will need to increase to 10,000 a day to work on weight loss.                  Subjective   SUBJECTIVE/OBJECTIVE:  HPI    Patient presents today for a follow up.      She would like to lose weight.  She recently had blood work that indicated her LDL was elevated.  She was not able to get Plenity.  She called that [facility] and the [facility] but did not get  an appointment.  She notes that she has gained 10 lb in a short amount of time but has not changed her activity or diet.    She states that her stomach hurts after she eats.  She reports to have infrequent bowel movements with constipation.  She has tried a fiber supplement.  She reports that she generally stays well hydrated.           Current Outpatient Medications on File Prior to Visit   Medication Sig Dispense Refill    Probiotic Product (PROBIOTIC PO) Take by mouth      Carboxymeth-Cellulose-CitricAc (PLENITY) CAPS Take three

## 2024-03-18 NOTE — TELEPHONE ENCOUNTER
Patient called regarding medical weight loss program; advised patient to review orientation in its entirety; if interested then send an email to the

## 2024-03-18 NOTE — PROGRESS NOTES
Health Decision Maker has been checked with the patient      Patient has stated that the scribe can come in room    Chief Complaint   Patient presents with    Follow-up     Doesn't know what about for today was told to resendiz a follow up- could be for the labs, or on weight gain, no medication was started for that at the last appointment      Depression: Not at risk (1/4/2024)    PHQ-2     PHQ-2 Score: 0      /84 (Site: Left Upper Arm)   Pulse 68   Temp 97.1 °F (36.2 °C)   Resp 16   Ht 1.727 m (5' 8\")   Wt 102.6 kg (226 lb 3.2 oz)   LMP 03/15/2024 (Exact Date)   SpO2 99%   BMI 34.39 kg/m²     \"Have you been to the ER, urgent care clinic since your last visit?  Hospitalized since your last visit?\"    NO    “Have you seen or consulted any other health care providers outside of [facility] since your last visit?”    NO           Specialist patient sees: GYN    Chart reviewed: immunizations are up to date and documented.   Immunization History   Administered Date(s) Administered    COVID-19, MODERNA BLUE border, Primary or Immunocompromised, (age 12y+), IM, 100 mcg/0.5mL 04/15/2021, 05/06/2021    HPV (Human Papilloma Virus)Vaccine 06/23/2021    HPV, GARDASIL 9, (age 9y-45y), IM, 0.5mL 06/23/2021    Hep B, Dialysis/Immuno, RECOMBIVAX-HB, (age 18y+), IM, 1mL 07/27/2021    Hepatitis A Vaccine 09/07/2018    Hepatitis B vaccine 07/27/2021    Influenza Virus Vaccine 11/09/2023    Meningococcal ACWY Vaccine 06/23/2021    Meningococcal, MCV4, Unspecifd Conjugate (A,C,Y and W-135) 09/07/2018    TDaP, ADACEL (age 10y-64y), BOOSTRIX (age 10y+), IM, 0.5mL 03/23/2005, 04/24/2012    Varicella, VARIVAX, (age 12m+), SC, 0.5mL 04/24/2023, 06/14/2023

## 2024-05-31 ENCOUNTER — OFFICE VISIT (OUTPATIENT)
Age: 24
End: 2024-05-31

## 2024-05-31 VITALS
BODY MASS INDEX: 33.33 KG/M2 | HEIGHT: 69 IN | HEART RATE: 67 BPM | OXYGEN SATURATION: 97 % | WEIGHT: 225 LBS | DIASTOLIC BLOOD PRESSURE: 73 MMHG | SYSTOLIC BLOOD PRESSURE: 117 MMHG | RESPIRATION RATE: 18 BRPM | TEMPERATURE: 98.5 F

## 2024-05-31 DIAGNOSIS — L21.9 SEBORRHEIC DERMATITIS: Primary | ICD-10-CM

## 2024-05-31 DIAGNOSIS — L21.9 SEBORRHEIC DERMATITIS OF SCALP: ICD-10-CM

## 2024-05-31 RX ORDER — KETOCONAZOLE 20 MG/G
CREAM TOPICAL
Qty: 30 G | Refills: 1 | Status: SHIPPED | OUTPATIENT
Start: 2024-05-31

## 2024-05-31 RX ORDER — KETOCONAZOLE 20 MG/ML
SHAMPOO TOPICAL
Qty: 120 ML | Refills: 0 | Status: SHIPPED | OUTPATIENT
Start: 2024-05-31

## 2024-05-31 NOTE — PATIENT INSTRUCTIONS
Your symptoms are consistent with seborrheic dermatitis  This is a chronic skin condition that can be managed  Typically flares up during periods of stress  I would like for you to try ketoconazole (shampoo for the scalp and cream for the face)  Shampoo 2-3x per week, use the cream 1-2x daily  I would like for you to try this for 4 weeks  If symptoms are not improved, I would like for you to follow up with dermatology for further workup, referral provided  Other differentials include psoriasis,

## 2024-06-10 ENCOUNTER — OFFICE VISIT (OUTPATIENT)
Age: 24
End: 2024-06-10

## 2024-06-10 VITALS
DIASTOLIC BLOOD PRESSURE: 84 MMHG | TEMPERATURE: 97.8 F | WEIGHT: 225 LBS | OXYGEN SATURATION: 97 % | HEIGHT: 69 IN | HEART RATE: 85 BPM | BODY MASS INDEX: 33.33 KG/M2 | SYSTOLIC BLOOD PRESSURE: 133 MMHG

## 2024-06-10 DIAGNOSIS — J01.00 ACUTE NON-RECURRENT MAXILLARY SINUSITIS: Primary | ICD-10-CM

## 2024-06-10 RX ORDER — AMOXICILLIN AND CLAVULANATE POTASSIUM 875; 125 MG/1; MG/1
1 TABLET, FILM COATED ORAL 2 TIMES DAILY
Qty: 14 TABLET | Refills: 0 | Status: SHIPPED | OUTPATIENT
Start: 2024-06-10 | End: 2024-06-17

## 2024-06-10 RX ORDER — CETIRIZINE HYDROCHLORIDE, PSEUDOEPHEDRINE HYDROCHLORIDE 5; 120 MG/1; MG/1
1 TABLET, FILM COATED, EXTENDED RELEASE ORAL 2 TIMES DAILY
Qty: 60 TABLET | Refills: 0 | Status: SHIPPED | OUTPATIENT
Start: 2024-06-10 | End: 2024-07-10

## 2024-06-10 ASSESSMENT — ENCOUNTER SYMPTOMS: COUGH: 1

## 2024-06-10 NOTE — PATIENT INSTRUCTIONS
Thank you for visiting [facility] today.    Flonase (over the counter) nasal spray, once a day  Saline nasal sprays 1-3 days   Afrin nasal spray for no longer than 3-5 days  Ibuprofen (400-800 mg) every 8 hours; Tylenol 325-500 mg every 6 hours)   Zyrtec/Xyzal/Allegra/Claritin during the day or Benadryl at night may help with allergies.  You may use the decongestant version of these medications as well.  Simple foods like chicken noodle soup, smoothies, hot tea with lemon and honey may also help  Steam inhalation, humidifier, warm compresses  Increase oral fluids to maintain hydration  Vitamin D 4000 IU each day for one month  1/2 teaspoon turmeric each day for anti-inflammation  Avoid smoking and minimize contact with environmental irritants  Antibiotics with food and probiotics     Please follow up with your primary care provider if your symptoms last more than 10 days or worsen.    Please go immediately to the Emergency Department if you develop:  Fever higher than 102F (38.9C), sudden and severe pain in the face and head, trouble seeing or seeing double, trouble thinking clearly, swelling or redness around one or both eyes or a stiff neck

## 2024-06-10 NOTE — PROGRESS NOTES
and not in need of emergent medical intervention.    -Provided educational material and patient instructions  -Discharged patient with instructions to follow up with PCP  -Advised to go immediately to the ED for worsening or persistent symptoms      I have discussed the results, diagnosis and treatment plan with the patient.  The patient also understands that early in the process of an illness, an urgent care workup can be falsely reassuring.  Routine discharge counseling and specific return precautions discussed with patient and the patient understands that worsening, changing or persistent symptoms should prompt an immediate return to the urgent care or emergency department.  Patient/Guardian expressed understanding and agrees with the discharge plan.  No further questions at time of discharge.    Mignon Velasquez, APRN - CNP

## 2024-06-11 ASSESSMENT — ENCOUNTER SYMPTOMS
SORE THROAT: 1
SHORTNESS OF BREATH: 0

## 2024-06-20 ENCOUNTER — OFFICE VISIT (OUTPATIENT)
Dept: PRIMARY CARE CLINIC | Facility: CLINIC | Age: 24
End: 2024-06-20
Payer: MEDICAID

## 2024-06-20 VITALS
BODY MASS INDEX: 33.4 KG/M2 | WEIGHT: 220.4 LBS | RESPIRATION RATE: 12 BRPM | TEMPERATURE: 97.1 F | OXYGEN SATURATION: 98 % | SYSTOLIC BLOOD PRESSURE: 115 MMHG | HEART RATE: 67 BPM | HEIGHT: 68 IN | DIASTOLIC BLOOD PRESSURE: 77 MMHG

## 2024-06-20 DIAGNOSIS — E66.09 CLASS 1 OBESITY DUE TO EXCESS CALORIES WITHOUT SERIOUS COMORBIDITY WITH BODY MASS INDEX (BMI) OF 33.0 TO 33.9 IN ADULT: ICD-10-CM

## 2024-06-20 DIAGNOSIS — L21.9 SEBORRHEIC DERMATITIS: ICD-10-CM

## 2024-06-20 DIAGNOSIS — Z13.1 DIABETES MELLITUS SCREENING: Primary | ICD-10-CM

## 2024-06-20 DIAGNOSIS — E78.2 MIXED HYPERLIPIDEMIA: ICD-10-CM

## 2024-06-20 DIAGNOSIS — K59.09 CHRONIC CONSTIPATION: ICD-10-CM

## 2024-06-20 DIAGNOSIS — Z13.1 DIABETES MELLITUS SCREENING: ICD-10-CM

## 2024-06-20 PROCEDURE — 99214 OFFICE O/P EST MOD 30 MIN: CPT | Performed by: FAMILY MEDICINE

## 2024-06-20 RX ORDER — KETOCONAZOLE 20 MG/ML
SHAMPOO TOPICAL
Qty: 120 ML | Refills: 5 | Status: SHIPPED | OUTPATIENT
Start: 2024-06-20

## 2024-06-20 SDOH — ECONOMIC STABILITY: INCOME INSECURITY: HOW HARD IS IT FOR YOU TO PAY FOR THE VERY BASICS LIKE FOOD, HOUSING, MEDICAL CARE, AND HEATING?: NOT HARD AT ALL

## 2024-06-20 SDOH — ECONOMIC STABILITY: FOOD INSECURITY: WITHIN THE PAST 12 MONTHS, YOU WORRIED THAT YOUR FOOD WOULD RUN OUT BEFORE YOU GOT MONEY TO BUY MORE.: NEVER TRUE

## 2024-06-20 SDOH — ECONOMIC STABILITY: FOOD INSECURITY: WITHIN THE PAST 12 MONTHS, THE FOOD YOU BOUGHT JUST DIDN'T LAST AND YOU DIDN'T HAVE MONEY TO GET MORE.: NEVER TRUE

## 2024-06-20 SDOH — ECONOMIC STABILITY: HOUSING INSECURITY
IN THE LAST 12 MONTHS, WAS THERE A TIME WHEN YOU DID NOT HAVE A STEADY PLACE TO SLEEP OR SLEPT IN A SHELTER (INCLUDING NOW)?: NO

## 2024-06-20 ASSESSMENT — PATIENT HEALTH QUESTIONNAIRE - PHQ9
SUM OF ALL RESPONSES TO PHQ9 QUESTIONS 1 & 2: 0
SUM OF ALL RESPONSES TO PHQ QUESTIONS 1-9: 0
2. FEELING DOWN, DEPRESSED OR HOPELESS: NOT AT ALL
1. LITTLE INTEREST OR PLEASURE IN DOING THINGS: NOT AT ALL
SUM OF ALL RESPONSES TO PHQ QUESTIONS 1-9: 0

## 2024-06-20 NOTE — PROGRESS NOTES
HPI     Chief Complaint   Patient presents with    Establish Care    GI Problem     Pt states that she has stomach pains after eating with gas-     Constipation     ongoing     She is a 24 y.o. female who presents to [facility] care.    Establishing Care    Pmhx : IBS-C, HLD.    C/o chronic problems with constipation.  No signs of bleeding.     Works as a nurse at [employer].    On fiber gummies.   Admits to consuming quite a bit of carbs.   Family is from [facility].          - Chronic medical problems:History reviewed. No pertinent past medical history.  - Current medications:   Current Outpatient Medications   Medication Sig    FIBER PO Take by mouth gummies    ketoconazole (NIZORAL) 2 % shampoo Apply topically daily as needed.    Probiotic Product (PROBIOTIC PO) Take by mouth    dicyclomine (BENTYL) 10 MG capsule Take 1 capsule by mouth in the morning, at noon, and at bedtime     No current facility-administered medications for this visit.     - Family history:   Family History   Problem Relation Age of Onset    Diabetes Father     Hypertension Father     Diabetes Mother      - Allergies: No Known Allergies  - Surgical history: No past surgical history on file.  - Social history (sexually active, occupation, smoker, etoh use, etc):   Social History     Socioeconomic History    Marital status: Single     Spouse name: Not on file    Number of children: Not on file    Years of education: Not on file    Highest education level: Not on file   Occupational History    Not on file   Tobacco Use    Smoking status: Never    Smokeless tobacco: Never   Vaping Use    Vaping Use: Never used   Substance and Sexual Activity    Alcohol use: Yes    Drug use: Never    Sexual activity: Not on file   Other Topics Concern    Not on file   Social History Narrative    Not on file     Social Determinants of Health     Financial Resource Strain: Low Risk  (6/20/2024)    Overall Financial Resource Strain (CARDIA)     Difficulty of Paying Living

## 2024-06-20 NOTE — PROGRESS NOTES
\"Have you been to the ER, urgent care clinic since your last visit?  Hospitalized since your last visit?\"    Urgent care for sinus infection     “Have you seen or consulted any other health care providers outside of [facility] since your last visit?”    NO            Click Here for Release of Records Request

## 2025-03-21 ENCOUNTER — OFFICE VISIT (OUTPATIENT)
Dept: PRIMARY CARE CLINIC | Facility: CLINIC | Age: 25
End: 2025-03-21

## 2025-03-21 VITALS
RESPIRATION RATE: 16 BRPM | HEIGHT: 68 IN | DIASTOLIC BLOOD PRESSURE: 75 MMHG | OXYGEN SATURATION: 97 % | BODY MASS INDEX: 36.62 KG/M2 | WEIGHT: 241.6 LBS | HEART RATE: 58 BPM | TEMPERATURE: 97.1 F | SYSTOLIC BLOOD PRESSURE: 113 MMHG

## 2025-03-21 DIAGNOSIS — Z23 ENCOUNTER FOR IMMUNIZATION: ICD-10-CM

## 2025-03-21 DIAGNOSIS — E78.2 MIXED HYPERLIPIDEMIA: Primary | ICD-10-CM

## 2025-03-21 DIAGNOSIS — E66.812 CLASS 2 OBESITY DUE TO EXCESS CALORIES WITHOUT SERIOUS COMORBIDITY WITH BODY MASS INDEX (BMI) OF 36.0 TO 36.9 IN ADULT: ICD-10-CM

## 2025-03-21 DIAGNOSIS — E66.09 CLASS 2 OBESITY DUE TO EXCESS CALORIES WITHOUT SERIOUS COMORBIDITY WITH BODY MASS INDEX (BMI) OF 36.0 TO 36.9 IN ADULT: ICD-10-CM

## 2025-03-21 SDOH — ECONOMIC STABILITY: FOOD INSECURITY: WITHIN THE PAST 12 MONTHS, YOU WORRIED THAT YOUR FOOD WOULD RUN OUT BEFORE YOU GOT MONEY TO BUY MORE.: NEVER TRUE

## 2025-03-21 SDOH — ECONOMIC STABILITY: FOOD INSECURITY: WITHIN THE PAST 12 MONTHS, THE FOOD YOU BOUGHT JUST DIDN'T LAST AND YOU DIDN'T HAVE MONEY TO GET MORE.: NEVER TRUE

## 2025-03-21 ASSESSMENT — PATIENT HEALTH QUESTIONNAIRE - PHQ9
SUM OF ALL RESPONSES TO PHQ QUESTIONS 1-9: 0
2. FEELING DOWN, DEPRESSED OR HOPELESS: NOT AT ALL
1. LITTLE INTEREST OR PLEASURE IN DOING THINGS: NOT AT ALL
SUM OF ALL RESPONSES TO PHQ QUESTIONS 1-9: 0

## 2025-03-21 ASSESSMENT — ENCOUNTER SYMPTOMS
BLOOD IN STOOL: 0
NAUSEA: 0
DIARRHEA: 0
CONSTIPATION: 1
COUGH: 0
WHEEZING: 0
VOMITING: 0
ABDOMINAL PAIN: 0
SHORTNESS OF BREATH: 0

## 2025-03-21 NOTE — PROGRESS NOTES
\"Have you been to the ER, urgent care clinic since your last visit?  Hospitalized since your last visit?\"    no    “Have you seen or consulted any other health care providers outside our system since your last visit?”    Weightloss specialist

## 2025-03-21 NOTE — PROGRESS NOTES
[facility]   [address], [address]  [place] [address]  P: [phone]  F: [phone]    SUBJECTIVE     HPI:     Adrienne Burroughs is a 25 y.o. female who is seen in the clinic for   Chief Complaint   Patient presents with    New Patient    Weight Management     Patient was on Metformin for two weeks-nausea and abdominal pain        The patient presents to the office today to follow-up on chronic conditions. Note reviewed from [facility] weight loss clinic.    Mixed hyperlipidemia  Trying to incorporate more vegetables and vegetables. Has cut back on rice and pasta. Does not eat much bread. Eating more poultry and shrimp than red meat and fish. Recently started trying to exercise 30 minutes 2-3 times weekly.     Obesity  Tried metformin through [facility] weight loss clinic, but this caused abdominal pain.     Health screenings:   Eye exam Done 2 years ago  Dental exam Done 2/2025  PAP smear Done 7/14/2022  Tdap  done today  COVID vaccine Done 2021    Influenza  Done 2024      There is no problem list on file for this patient.       No past medical history on file.  Current Outpatient Medications   Medication Sig Dispense Refill    ketoconazole (NIZORAL) 2 % shampoo Apply topically daily as needed. 120 mL 5    Probiotic Product (PROBIOTIC PO) Take by mouth      FIBER PO Take by mouth gummies (Patient not taking: Reported on 3/21/2025)      dicyclomine (BENTYL) 10 MG capsule Take 1 capsule by mouth in the morning, at noon, and at bedtime 90 capsule 0     No current facility-administered medications for this visit.     No Known Allergies  Immunization History   Administered Date(s) Administered    COVID-19, MODERNA BLUE border, Primary or Immunocompromised, (age 12y+), IM, 100 mcg/0.5mL 04/15/2021, 05/06/2021    HPV (Human Papilloma Virus)Vaccine 06/23/2021    HPV, GARDASIL 9, (age 9y-45y), IM, 0.5mL 06/23/2021    Hep B, Dialysis/Immuno, RECOMBIVAX-HB, (age 18y+), IM, 1mL 07/27/2021    Hepatitis A Vaccine

## 2025-04-22 ENCOUNTER — OFFICE VISIT (OUTPATIENT)
Age: 25
End: 2025-04-22

## 2025-04-22 VITALS
BODY MASS INDEX: 34.96 KG/M2 | OXYGEN SATURATION: 99 % | HEART RATE: 71 BPM | DIASTOLIC BLOOD PRESSURE: 86 MMHG | WEIGHT: 236 LBS | HEIGHT: 69 IN | SYSTOLIC BLOOD PRESSURE: 123 MMHG | TEMPERATURE: 98.6 F

## 2025-04-22 DIAGNOSIS — L02.31 ABSCESS AND CELLULITIS OF GLUTEAL REGION: Primary | ICD-10-CM

## 2025-04-22 DIAGNOSIS — L03.317 ABSCESS AND CELLULITIS OF GLUTEAL REGION: Primary | ICD-10-CM

## 2025-04-22 RX ORDER — IBUPROFEN 600 MG/1
600 TABLET, FILM COATED ORAL 3 TIMES DAILY PRN
Qty: 30 TABLET | Refills: 0 | Status: SHIPPED | OUTPATIENT
Start: 2025-04-22

## 2025-04-22 NOTE — PATIENT INSTRUCTIONS
Take the antibiotics until finished.  Warm compresses to the area several times a day.  Keep area clean and dry.  Motrin with food for pain, you may also add tylenol every 4 hours.  Follow up with general surgeon for further treatment.

## 2025-04-22 NOTE — PROGRESS NOTES
Patient Name: Adrienne Burroughs   YOB: 2000   Patient Status: Established patient,   Chief Complaint: Mass (Large mass below buttocks area, painful, warm to touch, swollen x 2 days )      ____________________________________________________________________________________________    External Records Reviewed: None    Limitation to History: None    Outside Historian: None    SUBJECTIVE/OBJECTIVE:  Adrienne Burroughs is a 25 y.o. female presents with complaint of pain and swelling to the inside of her left buttock gluteal fold.  Symptoms began 2 days ago and show no change since onset. Pt denies drainage from site or fevers.  Symptoms are aggravated by sitting and alleviated by  nothing. Patient reports taking nothing for symptom relief. No other acute symptoms reported at this time.          PAST MEDICAL HISTORY:   Medical: Pt  has no past medical history on file.  Surgical: Pt  has no past surgical history on file.  Family: Pt family history includes Clotting Disorder in her sister; Diabetes in her father and mother; High Blood Pressure in her mother; High Cholesterol in her father and mother; Hypertension in her father.  Social: Pt   Social History     Socioeconomic History    Marital status: Single     Spouse name: Not on file    Number of children: Not on file    Years of education: Not on file    Highest education level: Not on file   Occupational History    Not on file   Tobacco Use    Smoking status: Never    Smokeless tobacco: Never   Vaping Use    Vaping status: Never Used   Substance and Sexual Activity    Alcohol use: Yes    Drug use: Never    Sexual activity: Not Currently   Other Topics Concern    Not on file   Social History Narrative    Not on file     Social Drivers of Health     Financial Resource Strain: Low Risk  (6/20/2024)    Overall Financial Resource Strain (CARDIA)     Difficulty of Paying Living Expenses: Not hard at all   Food Insecurity: No Food Insecurity (3/21/2025)

## 2025-06-19 ENCOUNTER — OFFICE VISIT (OUTPATIENT)
Dept: PRIMARY CARE CLINIC | Facility: CLINIC | Age: 25
End: 2025-06-19
Payer: MEDICAID

## 2025-06-19 VITALS
RESPIRATION RATE: 14 BRPM | OXYGEN SATURATION: 100 % | HEART RATE: 67 BPM | BODY MASS INDEX: 34.87 KG/M2 | TEMPERATURE: 97.9 F | DIASTOLIC BLOOD PRESSURE: 77 MMHG | WEIGHT: 235.4 LBS | SYSTOLIC BLOOD PRESSURE: 129 MMHG | HEIGHT: 69 IN

## 2025-06-19 DIAGNOSIS — T88.7XXA MEDICATION SIDE EFFECT: ICD-10-CM

## 2025-06-19 DIAGNOSIS — R53.83 FATIGUE, UNSPECIFIED TYPE: ICD-10-CM

## 2025-06-19 DIAGNOSIS — R53.83 FATIGUE, UNSPECIFIED TYPE: Primary | ICD-10-CM

## 2025-06-19 DIAGNOSIS — R10.84 GENERALIZED ABDOMINAL PAIN: ICD-10-CM

## 2025-06-19 PROCEDURE — 99213 OFFICE O/P EST LOW 20 MIN: CPT

## 2025-06-19 RX ORDER — SEMAGLUTIDE 0.5 MG/.5ML
0.5 INJECTION, SOLUTION SUBCUTANEOUS
COMMUNITY
Start: 2025-05-27 | End: 2025-06-26

## 2025-06-19 SDOH — ECONOMIC STABILITY: FOOD INSECURITY: WITHIN THE PAST 12 MONTHS, YOU WORRIED THAT YOUR FOOD WOULD RUN OUT BEFORE YOU GOT MONEY TO BUY MORE.: NEVER TRUE

## 2025-06-19 SDOH — ECONOMIC STABILITY: FOOD INSECURITY: WITHIN THE PAST 12 MONTHS, THE FOOD YOU BOUGHT JUST DIDN'T LAST AND YOU DIDN'T HAVE MONEY TO GET MORE.: NEVER TRUE

## 2025-06-19 ASSESSMENT — PATIENT HEALTH QUESTIONNAIRE - PHQ9
SUM OF ALL RESPONSES TO PHQ QUESTIONS 1-9: 0
2. FEELING DOWN, DEPRESSED OR HOPELESS: NOT AT ALL
SUM OF ALL RESPONSES TO PHQ QUESTIONS 1-9: 0
SUM OF ALL RESPONSES TO PHQ QUESTIONS 1-9: 0
1. LITTLE INTEREST OR PLEASURE IN DOING THINGS: NOT AT ALL
SUM OF ALL RESPONSES TO PHQ QUESTIONS 1-9: 0

## 2025-06-19 ASSESSMENT — ENCOUNTER SYMPTOMS
WHEEZING: 0
SHORTNESS OF BREATH: 0
COUGH: 0
DIARRHEA: 0
BLOOD IN STOOL: 0
CONSTIPATION: 0
ABDOMINAL PAIN: 1
VOMITING: 0
NAUSEA: 1

## 2025-06-19 NOTE — PROGRESS NOTES
Have you been to the ER, urgent care clinic since your last visit?  Hospitalized since your last visit?   04/22/25 Urgent Care     Have you seen or consulted any other health care providers outside our system since your last visit?   [facility]

## 2025-06-19 NOTE — PROGRESS NOTES
[facility]   [address], [address]  [place] [address]  P: [phone]  F: [phone]    SUBJECTIVE     HPI:     Adrienne Burroughs is a 25 y.o. female who is seen in the clinic for   Chief Complaint   Patient presents with    Fatigue    Abdominal Pain    Started Ozempic [facility]        The patient presents to the office today c/o of fatigue, abdominal pain    Fatigue  Reports recent increase in fatigue, which she initially attributed to working nights. When she was off for a week the fatigue did not resolve. Feels her fatigue may have started when her Wegovy dose increased.    Abdominal pain  Sometimes has some nausea and abdominal pain, which she attributes to Wegovy. Denies vomiting. Pain is typically lower, but sometimes upper abdomen. The pain occurs a few times weekly. Currently on Wegovy 0.5 mg per [facility] weight loss clinic, noting she just took her third dose. Believes the pain has increased since increasing Wegovy. She is concerned this could be her pancreas.    There is no problem list on file for this patient.       History reviewed. No pertinent past medical history.  Current Outpatient Medications   Medication Sig Dispense Refill    WEGOVY 0.5 MG/0.5ML SOAJ SC injection Inject 0.5 mg into the skin every 7 days      Probiotic Product (PROBIOTIC PO) Take by mouth      ibuprofen (ADVIL;MOTRIN) 600 MG tablet Take 1 tablet by mouth 3 times daily as needed for Pain With food (Patient not taking: Reported on 6/19/2025) 30 tablet 0    FIBER PO Take by mouth gummies (Patient not taking: Reported on 6/19/2025)      ketoconazole (NIZORAL) 2 % shampoo Apply topically daily as needed. (Patient not taking: Reported on 6/19/2025) 120 mL 5    dicyclomine (BENTYL) 10 MG capsule Take 1 capsule by mouth in the morning, at noon, and at bedtime 90 capsule 0     No current facility-administered medications for this visit.     No Known Allergies  Immunization History   Administered Date(s)

## 2025-06-20 ENCOUNTER — RESULTS FOLLOW-UP (OUTPATIENT)
Dept: PRIMARY CARE CLINIC | Facility: CLINIC | Age: 25
End: 2025-06-20

## 2025-06-20 LAB
25(OH)D3+25(OH)D2 SERPL-MCNC: 21 NG/ML (ref 30–100)
BASOPHILS # BLD AUTO: 0 X10E3/UL (ref 0–0.2)
BASOPHILS NFR BLD AUTO: 0 %
EOSINOPHIL # BLD AUTO: 0 X10E3/UL (ref 0–0.4)
EOSINOPHIL NFR BLD AUTO: 0 %
ERYTHROCYTE [DISTWIDTH] IN BLOOD BY AUTOMATED COUNT: 13.6 % (ref 11.7–15.4)
HCT VFR BLD AUTO: 36 % (ref 34–46.6)
HGB BLD-MCNC: 11.2 G/DL (ref 11.1–15.9)
IMM GRANULOCYTES # BLD AUTO: 0 X10E3/UL (ref 0–0.1)
IMM GRANULOCYTES NFR BLD AUTO: 0 %
LIPASE SERPL-CCNC: 19 U/L (ref 14–72)
LYMPHOCYTES # BLD AUTO: 2.2 X10E3/UL (ref 0.7–3.1)
LYMPHOCYTES NFR BLD AUTO: 31 %
MCH RBC QN AUTO: 28.9 PG (ref 26.6–33)
MCHC RBC AUTO-ENTMCNC: 31.1 G/DL (ref 31.5–35.7)
MCV RBC AUTO: 93 FL (ref 79–97)
MONOCYTES # BLD AUTO: 0.5 X10E3/UL (ref 0.1–0.9)
MONOCYTES NFR BLD AUTO: 7 %
NEUTROPHILS # BLD AUTO: 4.3 X10E3/UL (ref 1.4–7)
NEUTROPHILS NFR BLD AUTO: 62 %
PLATELET # BLD AUTO: 293 X10E3/UL (ref 150–450)
RBC # BLD AUTO: 3.87 X10E6/UL (ref 3.77–5.28)
SPECIMEN STATUS REPORT: NORMAL
TSH SERPL DL<=0.005 MIU/L-ACNC: 3.14 UIU/ML (ref 0.45–4.5)
WBC # BLD AUTO: 7 X10E3/UL (ref 3.4–10.8)

## 2025-06-21 LAB — VIT B12 SERPL-MCNC: 645 PG/ML (ref 232–1245)

## 2025-07-17 ENCOUNTER — TELEPHONE (OUTPATIENT)
Dept: PRIMARY CARE CLINIC | Facility: CLINIC | Age: 25
End: 2025-07-17

## 2025-07-17 DIAGNOSIS — Z01.84 IMMUNITY STATUS TESTING: Primary | ICD-10-CM

## 2025-07-17 DIAGNOSIS — Z01.84 IMMUNITY STATUS TESTING: ICD-10-CM
